# Patient Record
Sex: MALE | Race: WHITE | NOT HISPANIC OR LATINO | Employment: FULL TIME | URBAN - METROPOLITAN AREA
[De-identification: names, ages, dates, MRNs, and addresses within clinical notes are randomized per-mention and may not be internally consistent; named-entity substitution may affect disease eponyms.]

---

## 2020-12-28 ENCOUNTER — CONSULT (OUTPATIENT)
Dept: NEPHROLOGY | Facility: CLINIC | Age: 25
End: 2020-12-28
Payer: COMMERCIAL

## 2020-12-28 VITALS
DIASTOLIC BLOOD PRESSURE: 86 MMHG | HEIGHT: 75 IN | SYSTOLIC BLOOD PRESSURE: 162 MMHG | BODY MASS INDEX: 34.69 KG/M2 | WEIGHT: 279 LBS

## 2020-12-28 DIAGNOSIS — N18.2 STAGE 2 CHRONIC KIDNEY DISEASE: Primary | ICD-10-CM

## 2020-12-28 DIAGNOSIS — I10 ESSENTIAL HYPERTENSION: ICD-10-CM

## 2020-12-28 PROCEDURE — 99204 OFFICE O/P NEW MOD 45 MIN: CPT | Performed by: INTERNAL MEDICINE

## 2020-12-28 RX ORDER — SPIRONOLACTONE 25 MG/1
25 TABLET ORAL DAILY
COMMUNITY
Start: 2020-12-11 | End: 2021-09-17 | Stop reason: SDUPTHER

## 2020-12-28 RX ORDER — HYDRALAZINE HYDROCHLORIDE 100 MG/1
200 TABLET, FILM COATED ORAL 2 TIMES DAILY
COMMUNITY
Start: 2020-12-11 | End: 2021-01-29 | Stop reason: SDUPTHER

## 2020-12-28 RX ORDER — FUROSEMIDE 20 MG/1
20 TABLET ORAL DAILY
COMMUNITY
End: 2021-01-29

## 2020-12-28 RX ORDER — AMLODIPINE BESYLATE 5 MG/1
5 TABLET ORAL DAILY
Qty: 90 TABLET | Refills: 3 | Status: SHIPPED | OUTPATIENT
Start: 2020-12-28 | End: 2021-01-29 | Stop reason: SDUPTHER

## 2020-12-28 RX ORDER — LABETALOL 300 MG/1
300 TABLET, FILM COATED ORAL 2 TIMES DAILY
COMMUNITY
Start: 2020-12-11

## 2020-12-28 RX ORDER — CLONIDINE HYDROCHLORIDE 0.2 MG/1
0.2 TABLET ORAL
COMMUNITY
Start: 2020-12-11 | End: 2021-01-29

## 2021-01-20 ENCOUNTER — TELEPHONE (OUTPATIENT)
Dept: NEPHROLOGY | Facility: CLINIC | Age: 26
End: 2021-01-20

## 2021-01-20 NOTE — TELEPHONE ENCOUNTER
Called and left voicemail for patient to discuss his blood pressure readings  Advised him to call back

## 2021-01-28 ENCOUNTER — TELEPHONE (OUTPATIENT)
Dept: NEPHROLOGY | Facility: CLINIC | Age: 26
End: 2021-01-28

## 2021-01-28 DIAGNOSIS — I10 ESSENTIAL HYPERTENSION: ICD-10-CM

## 2021-01-28 NOTE — TELEPHONE ENCOUNTER
Pt's Father Carmell Leventhal called the office asking if you could please give him a call he has some questions about some testing that Dr Balbir Corcoran wanted the pt to get done  He thinks that it may be similar to some tests that he had done already

## 2021-01-29 RX ORDER — AMLODIPINE BESYLATE 5 MG/1
5 TABLET ORAL 2 TIMES DAILY
Qty: 180 TABLET | Refills: 3 | Status: SHIPPED | OUTPATIENT
Start: 2021-01-29 | End: 2022-01-12

## 2021-01-29 RX ORDER — HYDRALAZINE HYDROCHLORIDE 100 MG/1
100 TABLET, FILM COATED ORAL 3 TIMES DAILY
Qty: 90 TABLET | Refills: 0 | Status: SHIPPED | OUTPATIENT
Start: 2021-01-29 | End: 2022-01-21

## 2021-01-29 RX ORDER — CHLORTHALIDONE 25 MG/1
25 TABLET ORAL DAILY
Qty: 30 TABLET | Refills: 3 | Status: SHIPPED | OUTPATIENT
Start: 2021-01-29 | End: 2021-04-20

## 2021-01-29 NOTE — TELEPHONE ENCOUNTER
Spoke to patient's mother over the phone regarding his blood pressure readings  His blood pressure still remains elevated and above goal   I advised him to stop Lasix, patient has already stopped taking clonidine 3 to 4 days ago due to that makes him really tired  Continue to remain off clonidine  Will start chlorthalidone 25 mg p o  Daily  Change hydralazine from 200 mg p o  B i d  To 100 mg t i d   Continue to monitor blood pressure  Will need to get 24 hour ambulatory blood pressure monitoring  Also with starting chlorthalidone, he will need to be closely monitor for lithium level  I have advised her to contact psychiatrist to see if lithium level needs to be monitored over next several weeks

## 2021-01-29 NOTE — TELEPHONE ENCOUNTER
I spoke to the patients father and mother and they will be faxing over labs done from mid January of this year and schedule his renal US  Patients parents had a question about the 24 HR Monitor which was supposed to be done and [facility] office did not call to schedule  Is this still to be done?, I do not see orders in epic  Blood pressures per mother have been elevated and will call with readings to document into his chart

## 2021-02-03 ENCOUNTER — TELEPHONE (OUTPATIENT)
Dept: NEPHROLOGY | Facility: CLINIC | Age: 26
End: 2021-02-03

## 2021-02-03 NOTE — TELEPHONE ENCOUNTER
I called patient to schedule 24 hr bp monitor, I spoke w/ his father and they are not willing to come to our [facility] office for the BP placement  They only want to go to the [facility] office for the placement and their monitor is out being serviced, and unfortunately we do not have a return date yet  The drive is too far for them to come to [facility] office

## 2021-04-17 DIAGNOSIS — I10 ESSENTIAL HYPERTENSION: ICD-10-CM

## 2021-04-20 ENCOUNTER — TELEPHONE (OUTPATIENT)
Dept: OTHER | Facility: HOSPITAL | Age: 26
End: 2021-04-20

## 2021-04-20 RX ORDER — CHLORTHALIDONE 25 MG/1
TABLET ORAL
Qty: 30 TABLET | Refills: 3 | Status: SHIPPED | OUTPATIENT
Start: 2021-04-20 | End: 2021-09-14

## 2021-04-20 NOTE — TELEPHONE ENCOUNTER
Can you please have him call us with all his BP readings for several days? Would like to know if BP improved after starting chlorthalidone

## 2021-04-21 NOTE — TELEPHONE ENCOUNTER
I called and left detailed message asking patient to call us back to let us know he received our message:  Please call us with BP readings for several days about one week  Would like to know if BP improved after starting chlorthalidone

## 2021-09-14 DIAGNOSIS — I10 ESSENTIAL HYPERTENSION: ICD-10-CM

## 2021-09-14 RX ORDER — CHLORTHALIDONE 25 MG/1
TABLET ORAL
Qty: 30 TABLET | Refills: 3 | Status: SHIPPED | OUTPATIENT
Start: 2021-09-14 | End: 2021-12-17 | Stop reason: SDUPTHER

## 2021-09-16 NOTE — TELEPHONE ENCOUNTER
I spoke to patients mother, Tiffanie Burgos and relayed message  Tiffanie Burgos states patient is on a very difficult work schedule and at physically demanding job he works from [employer] to [employer] and would not be a good candidate for the 24 hour ABPM due to this  Tiffanie Burgos states [place] blood pressures have been around 140/90 and 150/90 and the cardiologist told them that was as good as it will get due to the fact that he is a weight  and has such a physically demanding job  I asked Tiffanie Burgos to have Ofelia Jennings call us with a consistent week of blood pressures for evaluation  Thankyou

## 2021-09-17 DIAGNOSIS — I10 ESSENTIAL HYPERTENSION: Primary | ICD-10-CM

## 2021-09-17 RX ORDER — SPIRONOLACTONE 50 MG/1
50 TABLET, FILM COATED ORAL DAILY
Qty: 30 TABLET | Refills: 5 | Status: SHIPPED | OUTPATIENT
Start: 2021-09-17 | End: 2021-09-23 | Stop reason: SDUPTHER

## 2021-09-21 ENCOUNTER — DOCUMENTATION (OUTPATIENT)
Dept: NEPHROLOGY | Facility: CLINIC | Age: 26
End: 2021-09-21

## 2021-09-21 RX ORDER — ZINC GLUCONATE 50 MG
50 TABLET ORAL DAILY
COMMUNITY

## 2021-09-21 RX ORDER — CHLORAL HYDRATE 500 MG
1000 CAPSULE ORAL DAILY
COMMUNITY

## 2021-09-21 RX ORDER — CHOLECALCIFEROL (VITAMIN D3) 10 MCG
50 CAPSULE ORAL DAILY
COMMUNITY

## 2021-09-21 NOTE — PROGRESS NOTES
Patients mother, Chico Nava noticed patient is in fact taking the aldactone at 25mg daily, I asked to increase to 50mg daily  Please send refill  Thank you

## 2021-09-23 DIAGNOSIS — I10 ESSENTIAL HYPERTENSION: ICD-10-CM

## 2021-09-23 RX ORDER — SPIRONOLACTONE 50 MG/1
50 TABLET, FILM COATED ORAL DAILY
Qty: 30 TABLET | Refills: 5 | Status: SHIPPED | OUTPATIENT
Start: 2021-09-23

## 2021-12-17 DIAGNOSIS — I10 ESSENTIAL HYPERTENSION: ICD-10-CM

## 2021-12-21 RX ORDER — CHLORTHALIDONE 25 MG/1
25 TABLET ORAL DAILY
Qty: 30 TABLET | Refills: 3 | Status: SHIPPED | OUTPATIENT
Start: 2021-12-21

## 2022-01-21 ENCOUNTER — TELEMEDICINE (OUTPATIENT)
Dept: NEPHROLOGY | Facility: CLINIC | Age: 27
End: 2022-01-21
Payer: COMMERCIAL

## 2022-01-21 ENCOUNTER — DOCUMENTATION (OUTPATIENT)
Dept: NEPHROLOGY | Facility: CLINIC | Age: 27
End: 2022-01-21

## 2022-01-21 ENCOUNTER — TELEPHONE (OUTPATIENT)
Dept: OTHER | Facility: OTHER | Age: 27
End: 2022-01-21

## 2022-01-21 VITALS
DIASTOLIC BLOOD PRESSURE: 90 MMHG | BODY MASS INDEX: 32.95 KG/M2 | SYSTOLIC BLOOD PRESSURE: 148 MMHG | HEIGHT: 75 IN | WEIGHT: 265 LBS

## 2022-01-21 DIAGNOSIS — R03.0 ELEVATED BLOOD PRESSURE READING WITHOUT DIAGNOSIS OF HYPERTENSION: ICD-10-CM

## 2022-01-21 DIAGNOSIS — N18.2 STAGE 2 CHRONIC KIDNEY DISEASE: Primary | ICD-10-CM

## 2022-01-21 DIAGNOSIS — I10 ESSENTIAL HYPERTENSION: ICD-10-CM

## 2022-01-21 LAB
CO2 SERPL-SCNC: 26 MMOL/L (ref 21–32)
EXT GLUCOSE BLD: 109
EXTERNAL ALBUMIN: 5
EXTERNAL ALK PHOS: 74
EXTERNAL ALT: 40
EXTERNAL ANION GAP: 12
EXTERNAL AST: 46
EXTERNAL BUN: 29
EXTERNAL CALCIUM: 10.5
EXTERNAL CHLORIDE: 102
EXTERNAL CREATININE: 1.32
EXTERNAL EGFR: >60
EXTERNAL POTASSIUM: 5.2
EXTERNAL SODIUM: 140
EXTERNAL T.BILIRUBIN: 0.3
EXTERNAL TOTAL PROTEIN: 7.8
HCT VFR BLD AUTO: 50.1 % (ref 36.5–49.3)
HGB BLD-MCNC: 16.1 G/DL (ref 12–17)
LITHIUM SERPL-SCNC: 0.6 MMOL/L (ref 0.5–1)
PLATELET # BLD AUTO: 258 THOUSANDS/UL (ref 149–390)
TSH SERPL DL<=0.005 MIU/L-ACNC: 1.51 M[IU]/L
WBC # BLD AUTO: 7.9 THOUSAND/UL

## 2022-01-21 PROCEDURE — 99213 OFFICE O/P EST LOW 20 MIN: CPT | Performed by: PHYSICIAN ASSISTANT

## 2022-01-21 RX ORDER — HYDRALAZINE HYDROCHLORIDE 100 MG/1
100 TABLET, FILM COATED ORAL 2 TIMES DAILY
Qty: 90 TABLET | Refills: 0 | Status: SHIPPED | OUTPATIENT
Start: 2022-01-21

## 2022-01-21 RX ORDER — LABETALOL 100 MG/1
100 TABLET, FILM COATED ORAL 2 TIMES DAILY
Qty: 180 TABLET | Refills: 3 | Status: SHIPPED | OUTPATIENT
Start: 2022-01-21

## 2022-01-21 NOTE — TELEPHONE ENCOUNTER
Pt's mother called in stating the pt received a call this morning and didn't why  I called the office back line and the office was calling to confirm his appt and to state that they have his blood work

## 2022-01-21 NOTE — PATIENT INSTRUCTIONS
Hypertension- Antihypertensive regimen includes amlodipine 5mg twice a day, chlorthalidone 25mg daily, spironolactone 50mg daily, hydralazine 100mg twice a day, labetalol 300mg twice a day  Avoid salt in the diet  Avoid NSAIDs  Stay active  Please bring your home BP cuff to your next office visit for correlation  We would recommend a 24 hour ambulatory blood pressure monitor for accurate readings and better understanding of his home blood pressures for future management  Increase labetalol to 400mg twice a day  I ordered a 100mg tablet to be taken in addition to the 300mg tablet  Please call the office for any lightheadeness/dizziness, heart rate <50, or BP >150/90 or <110/70 consistently  Chronic Kidney Disease stage II- Baseline creatinine 1 3-1 4  Most recent creatinine and electrolytes are at baseline  GFR >60%  Etiology likely due to hypertensive nephrosclerosis and chronic lithium use  Due to large muscle mass, a 24 hour urine creatinine clearance would be more accurate  Chronic Lithium Use- Most recent level is 0 6  Follows with [facility] psychiatry every 3-4 months  His next appointment is in early May  Please follow up with Dr Feliberto Ridley or an advanced practitioner in 6 months  Please call the office with any questions or concerns

## 2022-01-21 NOTE — PROGRESS NOTES
Virtual Regular Visit    Verification of patient location:    Patient is located in the following state in which I hold an active license [place]    Assessment/Plan:    Problem List Items Addressed This Visit        Cardiovascular and Mediastinum    Essential hypertension    Relevant Medications    hydrALAZINE (APRESOLINE) 100 MG tablet    labetalol (NORMODYNE) 100 mg tablet    Other Relevant Orders    Basic metabolic panel    CBC    Urinalysis with microscopic    Protein / creatinine ratio, urine    Magnesium    24 hour blood pressure monitoring       Genitourinary    Stage 2 chronic kidney disease - Primary    Relevant Orders    Basic metabolic panel    CBC    Urinalysis with microscopic    Protein / creatinine ratio, urine    Magnesium      Other Visit Diagnoses     Elevated blood pressure reading without diagnosis of hypertension               Reason for visit is HTN/CKD  Chief Complaint   Patient presents with    Virtual Regular Visit        Encounter provider Vito Teresa, [place]    Provider located at [address] [place] [id number]      Recent Visits  No visits were found meeting these conditions  Showing recent visits within past 7 days and meeting all other requirements  Today's Visits  Date Type Provider Dept   01/21/22 Telemedicine Vito Rachel [address] [place] [address]   Showing today's visits and meeting all other requirements  Future Appointments  No visits were found meeting these conditions  Showing future appointments within next 150 days and meeting all other requirements       The patient was identified by name and date of birth  Hans Amanda was informed that this is a telemedicine visit and that the visit is being conducted through [address] [place] [address] Now and patient was informed that this is a secure, HIPAA-compliant platform  He agrees to proceed     My office door was closed  No one else was in the room    He acknowledged consent and understanding of privacy and security of the video platform  The patient has agreed to participate and understands they can discontinue the visit at any time  Patient is aware this is a billable service  Subjective  Ivan Alonso is a 27 y o  male who is being evaluated for hypertension and chronic kidney disease  He was last seen by Dr Iris Damon in December of 2020  At home, his blood pressure is around 140-50/90 and he checks it an hour after medications  His range is 135/80 to 165  He reduced his hydralazine to twice a day due to feeling lethargic with it  He still goes to the gym  He eats a healthy well balanced diet  He does get lightheaded when doing squats and we discussed changing this to a seated leg press  He denies LE edema or SOB  He denies N/V/D  We discussed doing a 24 hour ambulatory BP monitor and he is agreeable as long as he could do it on a weekend when he is not working  He is a  and would not be able to wear it during work  Assessment/Plan  Hypertension- Antihypertensive regimen includes amlodipine 5mg twice a day, chlorthalidone 25mg daily, spironolactone 50mg daily, hydralazine 100mg twice a day, labetalol 300mg twice a day  Avoid salt in the diet  Avoid NSAIDs  Stay active  Please bring your home BP cuff to your next office visit for correlation  We would recommend a 24 hour ambulatory blood pressure monitor for accurate readings and better understanding of his home blood pressures for future management  Increase labetalol to 400mg twice a day  I ordered a 100mg tablet to be taken in addition to the 300mg tablet  Please call the office for any lightheadeness/dizziness, heart rate <50, or BP >150/90 or <110/70 consistently  Chronic Kidney Disease stage II- Baseline creatinine 1 3-1 4  Most recent creatinine and electrolytes are at baseline  GFR >60%  Etiology likely due to hypertensive nephrosclerosis and chronic lithium use    Due to large muscle mass, a 24 hour urine creatinine clearance would be more accurate  Chronic Lithium Use- Most recent level is 0 6  Follows with [facility] psychiatry every 3-4 months  His next appointment is in early May  Please follow up with Dr Zenaida Romero or an advanced practitioner in 6 months  Please call the office with any questions or concerns  HPI     Past Medical History:   Diagnosis Date    Acute lateral meniscus tear of left knee     work injury occurred in December 2016    ADHD (attention deficit hyperactivity disorder)     since 8 y o     Anxiety     B[facility] disorder ([facility] [address] )     Complete heart block ([facility] [address] )     Secondary to Lyme disease    Depression     Lyme disease        Past Surgical History:   Procedure Laterality Date    ARTHROSCOPY KNEE Left 3/17/2016    Procedure: KNEE ARTHROSCOPY  WITH;  Surgeon: Ravinder Varner MD;  Location:  MAIN OR;  Service:    [address] [place] [address]      temporary pacer for transient CHB    COLONOSCOPY      MT KNEE SCOPE,MED/LAT MENISECTOMY Left 3/17/2016    Procedure: PARTIAL LATERAL MENISCECTOMY ;  Surgeon: Ravinder Varner MD;  Location:  MAIN OR;  Service: Orthopedics       Current Outpatient Medications   Medication Sig Dispense Refill    amLODIPine (NORVASC) 5 mg tablet TAKE 1 TABLET BY MOUTH TWICE DAILY 60 tablet 0    Atomoxetine HCl (STRATTERA PO) Take 80 mg by mouth daily       chlorthalidone 25 mg tablet Take 1 tablet (25 mg total) by mouth daily 30 tablet 3    Cholecalciferol (EQL Vitamin D3) 50 MCG (2000 UT) CAPS Take 50 Units by mouth daily      fexofenadine (ALLEGRA) 60 MG tablet Take 60 mg by mouth daily   Glucosamine-Chondroit-Vit C-Mn (GLUCOSAMINE 1500 COMPLEX PO) Take 1,500 mg by mouth 2 (two) times a day      labetalol (NORMODYNE) 300 mg tablet Take 300 mg by mouth 2 (two) times a day      lamoTRIgine (LaMICtal) 200 MG tablet Take 200 mg by mouth daily Indications: ADHD        lithium 600 MG capsule Take 600 mg by mouth 2 (two) times a day       Omega-3 Fatty Acids (fish oil) 1,000 mg Take 1,000 mg by mouth daily      Sertraline HCl (ZOLOFT PO) Take 50 mg by mouth daily       spironolactone (ALDACTONE) 50 mg tablet Take 1 tablet (50 mg total) by mouth daily 30 tablet 5    zinc gluconate 50 mg tablet Take 50 mg by mouth daily      hydrALAZINE (APRESOLINE) 100 MG tablet Take 1 tablet (100 mg total) by mouth 2 (two) times a day 90 tablet 0    labetalol (NORMODYNE) 100 mg tablet Take 1 tablet (100 mg total) by mouth 2 (two) times a day Take in addition to 300mg tablet for a total of 400mg twice a day  180 tablet 3     No current facility-administered medications for this visit  No Known Allergies    Review of Systems   Constitutional: Negative for appetite change and unexpected weight change  HENT: Negative for hearing loss  Respiratory: Negative for shortness of breath  Cardiovascular: Negative for leg swelling  Gastrointestinal: Negative for nausea and vomiting  Genitourinary: Negative for difficulty urinating  Musculoskeletal: Negative for gait problem  Skin: Negative for rash  Neurological: Negative for dizziness and light-headedness  Psychiatric/Behavioral: Negative for confusion  Video Exam    Vitals:    01/21/22 1036   BP: 148/90   Weight: 120 kg (265 lb)   Height: 6' 3" (1 905 m)       Physical Exam  Constitutional:       General: He is not in acute distress  Appearance: Normal appearance  HENT:      Head: Normocephalic and atraumatic  Nose: Nose normal    Eyes:      General: No scleral icterus  Pulmonary:      Effort: Pulmonary effort is normal  No respiratory distress  Abdominal:      General: There is no distension  Musculoskeletal:         General: No swelling  Normal range of motion  Cervical back: Normal range of motion  Skin:     Coloration: Skin is not jaundiced  Neurological:      Mental Status: He is alert and oriented to person, place, and time   Mental status is at baseline  Psychiatric:         Mood and Affect: Mood normal          Thought Content: Thought content normal           I spent 19 minutes directly with the patient during this visit    VIRTUAL VISIT DISCLAIMER      Trent Shelton verbally agrees to participate in [facility]  Pt is aware that [facility] could be limited without vital signs or the ability to perform a full hands-on physical Arn Plenty understands he or the provider may request at any time to terminate the video visit and request the patient to seek care or treatment in person

## 2022-01-31 ENCOUNTER — TELEPHONE (OUTPATIENT)
Dept: NEPHROLOGY | Facility: CLINIC | Age: 27
End: 2022-01-31

## 2022-01-31 NOTE — TELEPHONE ENCOUNTER
Called patient to schedule 24 BPM  Patient stated he would like to wait until March or April to do this

## 2022-02-22 ENCOUNTER — TELEPHONE (OUTPATIENT)
Dept: NEPHROLOGY | Facility: CLINIC | Age: 27
End: 2022-02-22

## 2022-03-07 NOTE — TELEPHONE ENCOUNTER
Attempted to reach patient to schedule ABPM  Sounded like phone number in system in a fax number now  Will send letter

## 2022-06-08 ENCOUNTER — TELEPHONE (OUTPATIENT)
Dept: NEPHROLOGY | Facility: CLINIC | Age: 27
End: 2022-06-08

## 2022-06-08 NOTE — TELEPHONE ENCOUNTER
Tried to call patient to schedule a follow up with Dr Roxanna Herrera but patient was unavailable  This is the first attempt

## 2022-06-14 NOTE — TELEPHONE ENCOUNTER
Tried to call patient to schedule a follow up, but the phone call would not go through  This is the second attempt

## 2022-06-17 ENCOUNTER — TELEPHONE (OUTPATIENT)
Dept: NEPHROLOGY | Facility: CLINIC | Age: 27
End: 2022-06-17

## 2022-10-10 ENCOUNTER — TELEPHONE (OUTPATIENT)
Dept: PSYCHIATRY | Facility: CLINIC | Age: 27
End: 2022-10-10

## 2022-10-10 NOTE — TELEPHONE ENCOUNTER
Client called back and wanted Ann-Marie Hodges to know that  "I stopped Zoloft about 3 weeks ago because I was worried about the weight gain  It has helped with the weight gain, but I still anxious and somewhat depressed at times " Next scheduled appointment is 11/2/22  Client inquiring if Manny Freddy would like him to try another medication prior to appointment to help with these symptoms  Message sent to Ann-Marie Hodges

## 2022-10-10 NOTE — TELEPHONE ENCOUNTER
Left message for client in response to voice mail received from mother that client stopped Zoloft due to weight gain and it's affecting his mental health  Next scheduled appointment with Zohaib Riverser is 11/2/22  Left voice mail on clarification on date Zoloft was stopped with client

## 2022-10-11 ENCOUNTER — TELEPHONE (OUTPATIENT)
Dept: PSYCHIATRY | Facility: CLINIC | Age: 27
End: 2022-10-11

## 2022-10-11 DIAGNOSIS — F90.2 ATTENTION DEFICIT HYPERACTIVITY DISORDER, COMBINED TYPE: ICD-10-CM

## 2022-10-11 DIAGNOSIS — F17.223: ICD-10-CM

## 2022-10-11 DIAGNOSIS — F31.0 BIPOLAR I DISORDER, MOST RECENT EPISODE HYPOMANIC (HCC): Primary | ICD-10-CM

## 2022-10-11 RX ORDER — ESCITALOPRAM OXALATE 5 MG/1
TABLET ORAL
Qty: 30 TABLET | Refills: 2 | Status: SHIPPED | OUTPATIENT
Start: 2022-10-11 | End: 2022-10-31

## 2022-10-11 NOTE — TELEPHONE ENCOUNTER
Message received from Renata Bajwa that Lexapro was sent to pharmacy for client to try  Called client to inform  Left VM and office number to call with any questions

## 2022-10-31 PROBLEM — F17.223: Status: ACTIVE | Noted: 2022-10-31

## 2022-10-31 PROBLEM — F31.0 BIPOLAR I DISORDER, MOST RECENT EPISODE HYPOMANIC (HCC): Status: ACTIVE | Noted: 2022-10-31

## 2022-10-31 PROBLEM — F90.2 ATTENTION DEFICIT HYPERACTIVITY DISORDER, COMBINED TYPE: Status: ACTIVE | Noted: 2022-10-31

## 2022-11-16 ENCOUNTER — TELEPHONE (OUTPATIENT)
Dept: PSYCHIATRY | Facility: CLINIC | Age: 27
End: 2022-11-16

## 2022-11-16 NOTE — TELEPHONE ENCOUNTER
Pt calling to r/s missed appt with Dr Shauna Breen   Transferred call to [facility], [place]

## 2022-12-07 DIAGNOSIS — F17.223: ICD-10-CM

## 2022-12-07 DIAGNOSIS — F90.2 ATTENTION DEFICIT HYPERACTIVITY DISORDER, COMBINED TYPE: ICD-10-CM

## 2022-12-07 DIAGNOSIS — F31.0 BIPOLAR I DISORDER, MOST RECENT EPISODE HYPOMANIC (HCC): Primary | ICD-10-CM

## 2022-12-07 RX ORDER — LITHIUM CARBONATE 300 MG
600 TABLET ORAL 2 TIMES DAILY
Qty: 360 TABLET | Refills: 0 | Status: SHIPPED | OUTPATIENT
Start: 2022-12-07

## 2022-12-07 RX ORDER — LAMOTRIGINE 200 MG/1
200 TABLET ORAL DAILY
Qty: 90 TABLET | Refills: 0 | Status: SHIPPED | OUTPATIENT
Start: 2022-12-07

## 2022-12-07 RX ORDER — ATOMOXETINE 80 MG/1
80 CAPSULE ORAL DAILY
Qty: 90 CAPSULE | Refills: 0 | Status: SHIPPED | OUTPATIENT
Start: 2022-12-07

## 2022-12-07 NOTE — TELEPHONE ENCOUNTER
Patient requesting refills for the following medications:    Lithium 300mg capsules 2 capsules bid  Lamotrigine 200mg  1 tablet at bedtime  Atomoxetine 80mg capsules 1 capsule daily  Sertraline 50mg tabs daily    [facility] in [place]    Appt scheduled 2/1/2023

## 2023-01-19 ENCOUNTER — TELEPHONE (OUTPATIENT)
Dept: PSYCHIATRY | Facility: CLINIC | Age: 28
End: 2023-01-19

## 2023-01-19 NOTE — TELEPHONE ENCOUNTER
Outreach call place on 1/18/2023 to get updated insurance information    If no insurance good saray estimate and acknowledge to self pay needs to be completed

## 2023-02-01 ENCOUNTER — TELEMEDICINE (OUTPATIENT)
Dept: PSYCHIATRY | Facility: CLINIC | Age: 28
End: 2023-02-01

## 2023-02-01 DIAGNOSIS — F17.223: ICD-10-CM

## 2023-02-01 DIAGNOSIS — F31.0 BIPOLAR I DISORDER, MOST RECENT EPISODE HYPOMANIC (HCC): Primary | ICD-10-CM

## 2023-02-01 DIAGNOSIS — F90.2 ATTENTION DEFICIT HYPERACTIVITY DISORDER, COMBINED TYPE: ICD-10-CM

## 2023-02-01 DIAGNOSIS — I10 ESSENTIAL HYPERTENSION: ICD-10-CM

## 2023-02-01 RX ORDER — ATOMOXETINE 100 MG/1
CAPSULE ORAL
Qty: 90 CAPSULE | Refills: 1 | Status: SHIPPED | OUTPATIENT
Start: 2023-02-01

## 2023-02-01 RX ORDER — LITHIUM CARBONATE 300 MG
TABLET ORAL
Qty: 360 TABLET | Refills: 1 | Status: SHIPPED | OUTPATIENT
Start: 2023-02-01

## 2023-02-01 RX ORDER — LAMOTRIGINE 200 MG/1
TABLET ORAL
Qty: 90 TABLET | Refills: 1 | Status: SHIPPED | OUTPATIENT
Start: 2023-02-01

## 2023-02-01 NOTE — PSYCH
Virtual Regular Visit    Verification of patient location: In  car    Patient is located in the following state in which I hold an active license [place]      Assessment/Plan:       Diagnoses and all orders for this visit:    Bipolar I disorder, most recent episode hypomanic ([address] )  -     sertraline (Zoloft) 50 mg tablet; Take 1 tablet (50 mg total) by mouth daily  -     lithium 300 MG tablet; Take 2 PO BID  -     lamoTRIgine (LaMICtal) 200 MG tablet; Take 1 PO Q HS    Attention deficit hyperactivity disorder, combined type  -     atomoxetine (STRATTERA) 100 MG capsule; Take 1 PO QD    Chewing tobacco nicotine dependence with withdrawal    Essential hypertension          Goals addressed in session:   Good Health  Counseling provided:      Treatment Recommendations- Risks Benefits       Immediate Medical/Psychiatric/Psychotherapy Treatments and Any Precautions:     Risks, Benefits And Possible Side Effects Of Medications:  Risks, benefits, and possible side effects of medications explained to patient and patient verbalizes understanding    Controlled Medication Discussion: No records found for controlled prescriptions according to [facility] [address]      Reason for visit is No chief complaint on file  Medication Management     Encounter provider MARTIN Esposito    Provider located at [address] [address]  [phone]      Recent Visits  No visits were found meeting these conditions  Showing recent visits within past 7 days and meeting all other requirements  Today's Visits  Date Type Provider Dept   02/01/23 Telemedicine Jennifer [facility] today's visits and meeting all other requirements  Future Appointments  No visits were found meeting these conditions    Showing future appointments within next 150 days and meeting all other requirements       The patient was identified by name and date of birth  Burke Lynne was informed that this is a telemedicine visit and that the visit is being conducted throughthe I[employer] platform  He agrees to proceed     My office door was closed  No one else was in the room  He acknowledged consent and understanding of privacy and security of the video platform  The patient has agreed to participate and understands they can discontinue the visit at any time  Patient is aware this is a billable service  Subjective    Burke Lynne is a 32 y o  male    Here today for a med check  This was via [address] [place] Now    normal appetite      HPI Mood good  Denies anxiety  Focus good  No problems with medication  Appetite Sleep good  Health OK  Has lost some Wt  Denies SI/HI    Past Medical History:   Diagnosis Date   • Acute lateral meniscus tear of left knee     work injury occurred in December 2016   • ADHD (attention deficit hyperactivity disorder)     since 8 y o    • Anxiety    • Bipolar disorder ([facility] [address] )    • Complete heart block ([facility] [address] )     Secondary to Lyme disease   • Depression    • Lyme disease        Past Surgical History:   Procedure Laterality Date   • ARTHROSCOPY KNEE Left 3/17/2016    Procedure: KNEE ARTHROSCOPY  WITH;  Surgeon: Ashkan Colin MD;  Location:  MAIN OR;  Service:    • CARDIAC PACEMAKER PLACEMENT      temporary pacer for transient CHB   • COLONOSCOPY     • MO KNEE SCOPE,MED/LAT MENISECTOMY Left 3/17/2016    Procedure: PARTIAL LATERAL MENISCECTOMY ;  Surgeon: Ashkan Colin MD;  Location:  MAIN OR;  Service: Orthopedics       Current Outpatient Medications   Medication Sig Dispense Refill   • atomoxetine (STRATTERA) 100 MG capsule Take 1 PO QD 90 capsule 1   • lamoTRIgine (LaMICtal) 200 MG tablet Take 1 PO Q HS 90 tablet 1   • lithium 300 MG tablet Take 2 PO  tablet 1   • sertraline (Zoloft) 50 mg tablet Take 1 tablet (50 mg total) by mouth daily 90 tablet 1   • amLODIPine (NORVASC) 5 mg tablet TAKE 1 TABLET BY MOUTH TWICE DAILY 60 tablet 0   • chlorthalidone 25 mg tablet Take 1 tablet (25 mg total) by mouth daily 30 tablet 3   • Cholecalciferol (EQL Vitamin D3) 50 MCG (2000 UT) CAPS Take 50 Units by mouth daily     • fexofenadine (ALLEGRA) 60 MG tablet Take 60 mg by mouth daily  • Glucosamine-Chondroit-Vit C-Mn (GLUCOSAMINE 1500 COMPLEX PO) Take 1,500 mg by mouth 2 (two) times a day     • hydrALAZINE (APRESOLINE) 100 MG tablet Take 1 tablet (100 mg total) by mouth 2 (two) times a day 90 tablet 0   • labetalol (NORMODYNE) 100 mg tablet Take 1 tablet (100 mg total) by mouth 2 (two) times a day Take in addition to 300mg tablet for a total of 400mg twice a day  180 tablet 3   • labetalol (NORMODYNE) 300 mg tablet Take 300 mg by mouth 2 (two) times a day     • Omega-3 Fatty Acids (fish oil) 1,000 mg Take 1,000 mg by mouth daily     • spironolactone (ALDACTONE) 50 mg tablet Take 1 tablet (50 mg total) by mouth daily 30 tablet 5   • zinc gluconate 50 mg tablet Take 50 mg by mouth daily       No current facility-administered medications for this visit          No Known Allergies    Social History     Substance and Sexual Activity   Drug Use No       Family History   Problem Relation Age of Onset   • Diabetes Mother    • Hypertension Mother    • Diabetes Father    • Hypertension Father    • Bipolar disorder Sister    • Anxiety disorder Sister            Objective    Mental status:  Appearance calm and cooperative , adequate hygiene and grooming and good eye contact    Mood mood appropriate   Affect affect appropriate    Speech a normal rate and fluent   Thought Processes normal thought processes   Hallucinations no hallucinations present    Thought Content no delusions   Abnormal Thoughts no suicidal thoughts  and no homicidal thoughts    Orientation  oriented to person and place and time   Remote Memory short term memory intact and long term memory intact   Attention Span concentration intact   Intellect Appears to be of Average Intelligence   Insight Insight intact   Judgement judgment was intact   Muscle Strength Muscle strength and tone were normal and Normal gait    Language no difficulty naming common objects   Fund of Knowledge displays adequate knowledge of current events   Pain none   Pain Scale 0       Video Exam    There were no vitals filed for this visit      I spent 15 minutes directly with the patient during this visit    Patient Instructions   Continue Tx  Increase Strattera  [facility]  Report Problems  Return 3 months  Session continued for 5 minutes after he left to document, meds and Tx Plan  Return 3 months       Visit Time    Visit Start Time: 5297  Visit Stop Time: 4672  Total Visit Duration: 23 min

## 2023-02-01 NOTE — PATIENT INSTRUCTIONS
Continue Tx  Increase Strattera  [facility]  Report Problems  Return 3 months  Session continued for 5 minutes after he left to document, meds and Tx Plan  Return 3 months

## 2023-02-01 NOTE — BH TREATMENT PLAN
TREATMENT PLAN (Medication Management Only)        [facility]    Name and Date of Birth:  Joseph Pringle 32 y o  [birth year]  Date of Treatment Plan: February 1, 2023  Diagnosis/Diagnoses:    1  Bipolar I disorder, most recent episode hypomanic ([address] )    2  Attention deficit hyperactivity disorder, combined type    3  Chewing tobacco nicotine dependence with withdrawal    4  Essential hypertension      Strengths/Personal Resources for Self-Care: supportive family, supportive friends, taking medications as prescribed, ability to adapt to life changes, ability to communicate needs, ability to communicate well, ability to listen, ability to reason, ability to understand psychiatric illness  Area/Areas of need (in own words): mood swings  1  Long Term Goal: maintain mood stability  Target Date:6 months - 8/1/2023  Person/Persons responsible for completion of goal: Fabrizio Steinberg  2  Short Term Objective (s) - How will we reach this goal?:   A  Provider new recommended medication/dosage changes and/or continue medication(s): continue current medications as prescribed Zoloft, Lamictal, Lithium, Strattera  B  N/A   C  N/A  Target Date:6 months - 8/1/2023  Person/Persons Responsible for Completion of Goal: Fabrizio Steinberg  Progress Towards Goals: stable  Treatment Modality: medication management every 3 months  Review due 180 days from date of this plan: 6 months - 8/1/2023  Expected length of service: maintenance  My Physician/PA/NP and I have developed this plan together and I agree to work on the goals and objectives  I understand the treatment goals that were developed for my treatment

## 2023-02-03 ENCOUNTER — TELEPHONE (OUTPATIENT)
Dept: PSYCHIATRY | Facility: CLINIC | Age: 28
End: 2023-02-03

## 2023-02-07 NOTE — TELEPHONE ENCOUNTER
Mother left voicemail stating patient was seen earlier in the week but RF of lexapro was not received at pharmacy  Lexapro not mentioned in most recent med check but note from 10/11 mentions med being sent in for patient to try  Forwarding to [facility] Client for review 
Patient's mother is inquiring about Lexapro  She states she is concerned because he hasn't had it  Patient's mother is requesting the Rx to be filled at indicated pharmacy 
TELEMETRY

## 2023-02-08 ENCOUNTER — TELEPHONE (OUTPATIENT)
Dept: PSYCHIATRY | Facility: CLINIC | Age: 28
End: 2023-02-08

## 2023-02-08 DIAGNOSIS — F31.0 BIPOLAR I DISORDER, MOST RECENT EPISODE HYPOMANIC (HCC): Primary | ICD-10-CM

## 2023-02-08 RX ORDER — ESCITALOPRAM OXALATE 5 MG/1
TABLET ORAL
Qty: 90 TABLET | Refills: 1 | Status: SHIPPED | OUTPATIENT
Start: 2023-02-08 | End: 2023-04-20 | Stop reason: SDUPTHER

## 2023-02-08 NOTE — TELEPHONE ENCOUNTER
Call to client (mother present for the call) TOM will be sent also  Client reports he has been taking Lexapro 5 mg (generic) since 10/11/22  He is not taking Zoloft due to weight gain  Client would like to stay on Lexapro, and would like RX sent to [facility] where he has been getting it  Call to [facility] to verify RX was there  RX is there and was sent 10/11/22 by Samira Coombs with 2 refills  Client out of refills  Above sent to Samira Coombs for approval of Lexapro

## 2023-06-15 ENCOUNTER — DOCUMENTATION (OUTPATIENT)
Dept: PSYCHIATRY | Facility: CLINIC | Age: 28
End: 2023-06-15

## 2023-06-15 NOTE — PSYCH
[facility]    Patient Name Heather Malcolm     Date of Birth: 25 y o  1995      MRN: [id number]    Admission Date: several years ago    Date of Transfer: Eboni 15, 2023    Admission Diagnosis:     Bipolar Disorder, type I, hypomanic  ADHD, Combined type    Current Diagnosis:     No diagnosis found  Reason for Admission: Margy Avelar presented for treatment due to depression and ADHD  Primary complaints included MOOD INSTABILITY SYMPTOMS: unremarkable and ADHD SYMPTOMS: unremarkable  Progress in Treatment: Margy Avelar was seen for Medication Management  During the course of treatment he      Episodes of Higher Level of Care: No    Transfer request Initiated by: Psychiatrist: Nurse Practitioner Mich Rodriguez Therapist: None    Reason for Transfer Request: clinician leaving practice    Does this individual need a clinician with specialized training/expertise?: No    Is this client working with any other \[facility] [place] [facility]\"" Providers/Therapists?  Psychiatrist: None Therapist: None    Other pertinent issues: None    Are there any specific individuals who would be a “best fit” or who have already agreed to accept this transfer request?      Psychiatrist: None   Therapist: None  Rationale: Not Applicable    Attempts to maintain the current therapeutic relationship: Not Applicable    Transfer request routed to Clinical Coordinator for input and/or approval      Comments from other involved providers and/or clinical coordinator: None    GUANACO AugusteNP06/15/23

## 2023-06-22 ENCOUNTER — TELEPHONE (OUTPATIENT)
Dept: PSYCHIATRY | Facility: CLINIC | Age: 28
End: 2023-06-22

## 2023-06-22 NOTE — TELEPHONE ENCOUNTER
Contacted client about cancelling 7/12/2023 appt  with MARTIN Rizo due to him retiring  We are in the process of hiring new providers within our practice  Once we have their schedules, we will contact you to reschedule your appt  Please call [phone] to request refills as needed

## 2023-08-14 DIAGNOSIS — F31.0 BIPOLAR I DISORDER, MOST RECENT EPISODE HYPOMANIC (HCC): ICD-10-CM

## 2023-08-14 RX ORDER — ARIPIPRAZOLE 5 MG/1
TABLET ORAL
Qty: 30 TABLET | Refills: 2 | Status: SHIPPED | OUTPATIENT
Start: 2023-08-14

## 2023-08-14 NOTE — TELEPHONE ENCOUNTER
Left message letting pt know that he should have a refill on file on all medications except for the abilify which we sent for approval    Tiffanie Puckett pt, not yet scheduled with new provider

## 2023-10-05 ENCOUNTER — TELEPHONE (OUTPATIENT)
Dept: PSYCHIATRY | Facility: CLINIC | Age: 28
End: 2023-10-05

## 2023-11-02 ENCOUNTER — TELEPHONE (OUTPATIENT)
Dept: PSYCHIATRY | Facility: CLINIC | Age: 28
End: 2023-11-02

## 2023-11-21 ENCOUNTER — OFFICE VISIT (OUTPATIENT)
Dept: PSYCHIATRY | Facility: CLINIC | Age: 28
End: 2023-11-21
Payer: COMMERCIAL

## 2023-11-21 DIAGNOSIS — F90.2 ATTENTION DEFICIT HYPERACTIVITY DISORDER, COMBINED TYPE: ICD-10-CM

## 2023-11-21 DIAGNOSIS — Z79.899 MEDICATION COURSE CHANGED: Primary | ICD-10-CM

## 2023-11-21 DIAGNOSIS — F31.0 BIPOLAR I DISORDER, MOST RECENT EPISODE HYPOMANIC (HCC): ICD-10-CM

## 2023-11-21 PROCEDURE — 90792 PSYCH DIAG EVAL W/MED SRVCS: CPT | Performed by: STUDENT IN AN ORGANIZED HEALTH CARE EDUCATION/TRAINING PROGRAM

## 2023-11-21 RX ORDER — LAMOTRIGINE 200 MG/1
TABLET ORAL
Qty: 90 TABLET | Refills: 1 | Status: SHIPPED | OUTPATIENT
Start: 2023-11-21

## 2023-11-21 RX ORDER — ATOMOXETINE 100 MG/1
CAPSULE ORAL
Qty: 90 CAPSULE | Refills: 1 | Status: SHIPPED | OUTPATIENT
Start: 2023-11-21

## 2023-11-21 RX ORDER — ARIPIPRAZOLE 5 MG/1
TABLET ORAL
Qty: 30 TABLET | Refills: 2 | Status: SHIPPED | OUTPATIENT
Start: 2023-11-21

## 2023-11-21 RX ORDER — LITHIUM CARBONATE 300 MG
TABLET ORAL
Qty: 360 TABLET | Refills: 1 | Status: SHIPPED | OUTPATIENT
Start: 2023-11-21

## 2023-11-21 NOTE — PSYCH
Client Name: Maybell Hamman       Client YOB: 1995  : 1995    Treatment Team:     Psychiatrist: [address] Provider  Philip Salvador, [address]  [facility] [place] [address]        Plan Type: adolescent/adult (15 and over) Initial    My Personal Strengths are (in the client's own words):  "Funny, loving, caring"    The stressors and triggers that may put me at risk are:  Finances, world issues    Coping skills I can use to keep myself calm and safe:  Music, exercise    Coping skills/supports I can use to maintain abstinence from substance use:   Not Applicable    The people that provide me with help and support: (Include name, contact, and how they can help)    Support Persons #1:   *Extended Emergency Contact Information  Primary Emergency Contact: Horner  Address: [address] [place], [address] [address] [address] [place]feiffer Phone:   Relation: Mother   *How they can help me? "Take to hospital"        If it is an emergency and you need immediate help, call     If there is a possibility of danger to yourself or others, call the following crisis hotline resources:     Adult Crisis Numbers  Suicide Prevention Hotline - Dial   [facility]: [address]: [address]: [address]: [phone]  [address] [place] [address] Street: [phone]  [facility]: [address]: [address]: [phone] (daytime). [phone] (after hours, weekends, holidays)     Child/Adolescent Crisis Numbers   [facility]: [address]: [phone]   Neeraj Huger: [phone]   [address] [place] [address] Street: [phone]    Please note: Some [facility] do not have a separate number for Child/Adolescent specific crisis.  If your county is not listed under Child/Adolescent, please call the adult number for your county     National Talk to Text Line   All Ages - [phone]    In the event your feelings become unmanageable, and you cannot reach your support system, you will call 911 immediately or go to the nearest hospital emergency room. If you have any physical or medical emergency or feel as if you are in physical/medical distress, call 911 immediately or go to the nearest hospital emergency room.

## 2023-11-29 NOTE — PSYCH
[facility]    Name and Date of Birth:  Ladan French 25 y.o. [birth year] MRN: [id number]    Date of Visit: November 29, 2023    Reason for visit: Full psychiatric intake assessment for medication management     HPI     Ladan French is a 25 y.o. male with a past psychiatric history significant for  who presents to the [address] [place] [address] outpatient clinic for intake assessment. Judith Valenzuela presents as a new patient for this physician after being transferred from his previous provider who had retired. When speaking to the patient, he denies SI, HI, AVH, delusions with regards to his past history. After thoroughly exploring with patient the signs and symptoms of manic episodes, it appears that patient has symptoms that resemble IED rather than manic episodes. The criteria for manic episodes was thoroughly would reviewed with patient who denied having signs and symptoms for them. At this time, he denies SI, HI, AVH, delusions, seth but does endorse some minor feelings of depression and anxiety secondary to financial difficulties. He states that his concern in the past has been his anger outbursts. He spends his time performing outdoor activities, working, and spending time with family and his pet dog. He denies acute mental complaints or concerns at this time. Current Rating Scores:     Current PHQ-9   PHQ-2/9 Depression Screening           Current JOAO-7 is .     Psychiatric Review Of Systems:    Sleep changes: no  Appetite changes: no  Weight changes: no  Energy/anergy: decreased  Interest/pleasure/anhedonia: decreased  Somatic symptoms: no  Anxiety/panic: worrying  Seth: no  Guilty/hopeless: no  Self injurious behavior/risky behavior: no  Suicidal ideation: no  Homicidal ideation: no  Auditory hallucinations: no  Visual hallucinations: no  Other hallucinations: no  Delusional thinking: no  Eating disorder history: unknown  Obsessive/compulsive symptoms: unknown    Review Of Systems:    Constitutional negative   ENT negative   Cardiovascular negative   Respiratory negative   Gastrointestinal negative   Genitourinary negative   Musculoskeletal negative   Integumentary negative   Neurological negative   Endocrine negative   Other Symptoms none, all other systems are negative       Family Psychiatric History:     Family History   Problem Relation Age of Onset    Diabetes Mother     Hypertension Mother     Diabetes Father     Hypertension Father     Bipolar disorder Sister     Anxiety disorder Sister          Past Psychiatric History:     Inpatient psychiatric admissions: None reported  Prior outpatient psychiatric linkage: None reported  Past/current psychotherapy: None reported  History of suicidal attempts/gestures: None reported  History of violence/aggressive behaviors: States that he has had anger outbursts in the past that have involved him punching things in the house or yelling but denies ever hurting other people. Psychotropic medication trials: Multiple medications in the past the patient cannot recall at this time  Substance abuse inpatient/outpatient rehabilitation: None reported    Substance Abuse History:    No history of ETOH, illict substance, or tobacco abuse. No past legal actions or arrests secondary to substance intoxication. The patient denies prior DWIs/DUIs. No history of outpatient/inpatient rehabilitation programs. Noemi Sauceda does not exhibit objective evidence of substance withdrawal during today's examination nor does Noemi Sauceda appear under the influence of any psychoactive substance. Social History:    Developmental: Denies a history of milestone/developmental delay. Denies a history of in-utero exposure to toxins/illicit substances. There is no documented history of IEP or need for special education.   Education: high school diploma/GED  Marital history:   Living arrangement, social support: wife  Occupational History: Works in the [employer] to [employer]: Denies direct access to weapons/firearms. Daniel Ray has no history of arrests or violence with a deadly weapon. Traumatic History:     Abuse:none is reported  Other Traumatic Events:  None reported    Past Medical History:    Past Medical History:   Diagnosis Date    Acute lateral meniscus tear of left knee     work injury occurred in December 2016    ADHD (attention deficit hyperactivity disorder)     since 8 y.o. Anxiety     Bipolar disorder ([address])     Complete heart block (HCC)     Secondary to Lyme disease    Depression     Lyme disease         Past Surgical History:   Procedure Laterality Date    ARTHROSCOPY KNEE Left 3/17/2016    Procedure: KNEE ARTHROSCOPY  WITH;  Surgeon: Anderson Hernandes MD;  Location:  MAIN OR;  Service:     CARDIAC PACEMAKER PLACEMENT      temporary pacer for transient CHB    COLONOSCOPY      MD KNEE SCOPE,MED/LAT MENISECTOMY Left 3/17/2016    Procedure: PARTIAL LATERAL MENISCECTOMY ;  Surgeon: Anderson Hernandes MD;  Location:  MAIN OR;  Service: Orthopedics     No Known Allergies    History Review: The following portions of the patient's history were reviewed and updated as appropriate: allergies, current medications, past family history, past medical history, past social history, past surgical history, and problem list.    OBJECTIVE:    Vital signs in last 24 hours: There were no vitals filed for this visit.     Mental Status Evaluation:    Appearance age appropriate, casually dressed   Behavior cooperative, calm   Speech normal rate, normal volume, normal pitch   Mood euthymic   Affect constricted   Thought Processes organized, goal directed   Associations intact associations   Thought Content no overt delusions   Perceptual Disturbances: no auditory hallucinations, no visual hallucinations   Abnormal Thoughts  Risk Potential Suicidal ideation - None  Homicidal ideation - None  Potential for aggression - No   Orientation oriented to person, place, time/date, and situation   Memory recent and remote memory grossly intact   Consciousness alert and awake   Attention Span Concentration Span attention span and concentration are age appropriate   Intellect appears to be of average intelligence   Insight intact   Judgement intact   Muscle Strength and  Gait normal muscle strength and normal muscle tone, normal gait and normal balance   Motor Activity no abnormal movements   Language no difficulty naming common objects, no difficulty repeating a phrase   Fund of Knowledge adequate knowledge of current events  adequate fund of knowledge regarding past history  adequate fund of knowledge regarding vocabulary    Pain none   Pain Scale 0       Laboratory Results: I have personally reviewed all pertinent laboratory/tests results    Recent Labs (last 2 months):   No visits with results within 2 Month(s) from this visit. Latest known visit with results is:   Documentation on 01/21/2022   Component Date Value    WBC 01/04/2022 7.90     Hemoglobin 01/04/2022 16.1     Hematocrit 01/04/2022 50.1 (A)     Platelets 76/28/0997 258     SODIUM 01/04/2022 140     POTASSIUM 01/04/2022 5.2     CHLORIDE 01/04/2022 102     CO2 01/04/2022 26     ANION GAP 01/04/2022 12     BUN 01/04/2022 29     CREATININE 01/04/2022 1.32     Glucose 01/04/2022 109     EXTERNAL CALCIUM 01/04/2022 10.5     TOTAL PROTEIN 01/04/2022 7.8     ALBUMIN 01/04/2022 5.0     AST 01/04/2022 46     ALT 01/04/2022 40     ALK PHOS 01/04/2022 74     EXTERNAL TOT.  BILIRUBIN 01/04/2022 0.3     EXTERNAL EGFR 01/04/2022 >60     Lithium Lvl 01/04/2022 0.6     TSH 01/04/2022 1.510        Suicide/Homicide Risk Assessment:    Risk of Harm to Self:  The following ratings are based on assessment at the time of the interview  Historical Risk Factors include: chronic psychiatric problems  Protective Factors: no current suicidal ideation, access to mental health treatment, being , compliant with medications, compliant with mental health treatment, having a desire to be alive    Risk of Harm to Others: The following ratings are based on assessment at the time of the interview  Historical Risk Factors include: history of aggressive behavior. Protective Factors: no current homicidal ideation, being , compliant with medications, compliant with mental health treatment, safe and stable living environment, supportive family, supportive friends    The following interventions are recommended: contracts for safety at present - agrees to go to ED if feeling unsafe, contracts for safety at present - agrees to call Crisis Intervention Service if feeling unsafe. Although patient's acute lethality risk is LOW, long-term/chronic lethality risk is mildly elevated given historical mental health diagoses. However, at the current moment, Gabby Schneider is future-oriented, forward-thinking, and demonstrates ability to act in a self-preserving manner as evidenced by volitionally presenting to the appointment today, seeking treatment. Additionally, Santana's responses suggest a will and desire to live. At this juncture, inpatient hospitalization is not currently warranted. To mitigate future risk, patient should adhere to treatment recommendations, avoid alcohol/illicit substance use, utilize community-based resources and familiar support, and prioritize mental health treatment. DSM-V Diagnoses:     1.)  IED  2.)  JOAO  3.)  Rule out BPAD    Assessment/Plan:     After discussion of risks, benefits, potential side effects and alternatives, will continue patient on current regimen and assessed lithium level and follow-up thereafter to see if any modifications can be made to help patient with impulse control. He voices understanding and agreement at our crisis plan and denies that his anger outbursts have led to him hurting others including his partner.   He denies acute mental complaints or concerns at this time. Today's Plan/Medical Decision Making:    Psychopharmacologically, I spoke at length with Malachi Cleaning about the bio-psycho-social approach to treatment and avenues for intervention. I stressed the importance of making better dietary choices, expanding exercise regimen, and reestablishing a sense of purpose and connectivity in life. I also emphasized the importance of establishing care with the primary care physician along with specialists relevant to their medical diagnoses to which the patient voiced understanding and agreement. Treatment Recommendations/Precautions:        Medication management every 1 months  Aware of 24 hour and weekend coverage for urgent situations accessed by calling [facility] main practice number    Patient voiced understanding and agreement to call 911 or head to the nearest emergency room should they experience any physical decompensation whatsoever including but not limited to the red flag signs and symptoms of fevers, chills, chest pains, nausea, vomiting, dizziness, changes in vision, trouble breathing. Patient was also offered the contact information of their local [facility] crisis hotline and voiced understanding and agreement to call it or [phone] or head to nearest emergency department immediately should they experience any mental health decompensation whatsoever including but not limited to SI, HI, increasing AVH, seth. Medications Risks/Benefits:      Risks, Benefits And Possible Side Effects Of Medications:    Risks, benefits, and possible side effects of medications explained to Malachi Cleaning and he verbalizes understanding and agreement for treatment. Controlled Medication Discussion:     Not applicable    Treatment Plan:    Completed and signed during the session:   We will complete at next appointment due to lack of time today      Visit Time    Visit Start Time: 3:00 PM  Visit Stop Time: 3:45 PM  Total Visit Duration:  45 minutes The total visit duration detailed above includes: patient engagement, medication management, psychotherapy/counseling, discussion regarding treatment goals, documentation, review of past medical records, and coordination of care. Note Share Disclaimer:      This note was not shared with the patient due to reasonable likelihood of causing patient harm    Hazel Alcaraz DO  11/29/23

## 2023-12-06 ENCOUNTER — TELEPHONE (OUTPATIENT)
Dept: PSYCHIATRY | Facility: CLINIC | Age: 28
End: 2023-12-06

## 2023-12-06 NOTE — TELEPHONE ENCOUNTER
Outreach call place to inform client that the 12/19/23 appointment needs to be Virtual due to Dr Cherelle Padgett changing to Virtual this day. If in-person appointment preferred there is availability on 12/18/23. Requested a call back to [phone].

## 2024-01-04 ENCOUNTER — TELEMEDICINE (OUTPATIENT)
Dept: PSYCHIATRY | Facility: CLINIC | Age: 29
End: 2024-01-04
Payer: COMMERCIAL

## 2024-01-04 DIAGNOSIS — F90.2 ATTENTION DEFICIT HYPERACTIVITY DISORDER, COMBINED TYPE: Primary | ICD-10-CM

## 2024-01-04 PROCEDURE — 99212 OFFICE O/P EST SF 10 MIN: CPT | Performed by: STUDENT IN AN ORGANIZED HEALTH CARE EDUCATION/TRAINING PROGRAM

## 2024-01-04 NOTE — PSYCH
MEDICATION MANAGEMENT NOTE        [facility]      Name and Date of Birth:  Santana Norris 28 y.o. 1995 MRN: [id number]    Date of Visit: January 4, 2024    Reason for Visit: Follow-up visit for medication management     Virtual Visit Disclaimer:       TeleMed provider: Hazel Alcaraz D.O.    Location: [place]     Verification of patient location:     Patient is currently located in the [facility]  Patient is currently located in a state in which I am licensed     After connecting through [employer], the patient was identified by name and date of birth.  Santana Norris was informed that this is a telemedicine visit that is being conducted through [employer], and the patient was informed that this is a secure, HIPAA-compliant platform. My office door was closed. No one else was in the room. Santana Norris acknowledged consent and understanding of privacy and security of the video platform. Santana understands that the online visit is based solely on information provided by the patient, and that, in the absence of a face-to-face physical evaluation by the physician, the diagnosis Santana  receives is both limited and provisional in terms of accuracy and completeness. Santana Norris understands that they can discontinue the visit at any time. I informed Santana that I have reviewed their record in EPIC and presented the opportunity for them to ask any questions regarding the visit today. Santana Norris voiced understanding and consented to these terms. Santana is aware this is a billable service. Santana is present in [place]      SUBJECTIVE:    Santana Norris is a 28 y.o. male with past psychiatric history significant for  who was personally seen and evaluated today at the [facility] outpatient clinic for follow-up and medication management. Santana denies SI, HI, AVH, delusions, seth since our last appointment.  They did endorse some feelings of  depression and anxiety which have been manageable and attributable to the stress of organizing holiday gatherings.  Patient states that he has been feeling better now that the holidays are over.  He did mention that he had self-discontinued escitalopram 5 mg/day and that he noticed no difference with his mood.  Otherwise, he endorses compliance with the rest of his regimen, denies acute mental complaints or concerns at this time and voices understanding and agreement to our crisis plan.  He remains engaged with family, friends and personal hobbies        Current Rating Scores:     None completed today.    Review Of Systems:      Constitutional negative   ENT negative   Cardiovascular negative   Respiratory negative   Gastrointestinal negative   Genitourinary negative   Musculoskeletal negative   Integumentary negative   Neurological negative   Endocrine negative   Other Symptoms none, all other systems are negative       Past Psychiatric History: (unchanged information from previous note copied and italicized) - Information that is bolded has been updated.         Substance Abuse History: (unchanged information from previous note copied and italicized) - Information that is bolded has been updated.         Social History: (unchanged information from previous note copied and italicized) - Information that is bolded has been updated.         Traumatic History: (unchanged information from previous note copied and italicized) - Information that is bolded has been updated.           Past Medical History:    Past Medical History:   Diagnosis Date    Acute lateral meniscus tear of left knee     work injury occurred in December 2016    ADHD (attention deficit hyperactivity disorder)     since 4 y.o.    Anxiety     Bipolar disorder (HCC)     Complete heart block (HCC)     Secondary to Lyme disease    Depression     Lyme disease         Past Surgical History:   Procedure Laterality Date    ARTHROSCOPY KNEE Left 3/17/2016     Procedure: KNEE ARTHROSCOPY  WITH;  Surgeon: Lillian Mora MD;  Location:  MAIN OR;  Service:     CARDIAC PACEMAKER PLACEMENT      temporary pacer for transient CHB    COLONOSCOPY      FL KNEE SCOPE,MED/LAT MENISECTOMY Left 3/17/2016    Procedure: PARTIAL LATERAL MENISCECTOMY ;  Surgeon: Lillian Mora MD;  Location:  MAIN OR;  Service: Orthopedics     No Known Allergies    Substance Abuse History:    Social History     Substance and Sexual Activity   Alcohol Use Yes    Comment: on occasion     Social History     Substance and Sexual Activity   Drug Use No       Social History:    Social History     Socioeconomic History    Marital status: Single     Spouse name: Not on file    Number of children: Not on file    Years of education: Not on file    Highest education level: Not on file   Occupational History    Not on file   Tobacco Use    Smoking status: Former    Smokeless tobacco: Never    Tobacco comments:     occ. cigar since 5 yrs.    Substance and Sexual Activity    Alcohol use: Yes     Comment: on occasion    Drug use: No    Sexual activity: Not on file   Other Topics Concern    Not on file   Social History Narrative    Not on file     Social Determinants of Health     Financial Resource Strain: Not on file   Food Insecurity: Not on file   Transportation Needs: Not on file   Physical Activity: Not on file   Stress: Not on file   Social Connections: Not on file   Intimate Partner Violence: Not on file   Housing Stability: Not on file       Family Psychiatric History:     Family History   Problem Relation Age of Onset    Diabetes Mother     Hypertension Mother     Diabetes Father     Hypertension Father     Bipolar disorder Sister     Anxiety disorder Sister        History Review: The following portions of the patient's history were reviewed and updated as appropriate: allergies, current medications, past family history, past medical history, past social history, past surgical history, and problem  list.         OBJECTIVE:     Vital signs in last 24 hours:    There were no vitals filed for this visit.    Mental Status Evaluation:    Appearance age appropriate, casually dressed   Behavior cooperative, calm   Speech normal rate, normal volume, normal pitch   Mood normal   Affect constricted   Thought Processes organized, goal directed   Associations intact associations   Thought Content no overt delusions   Perceptual Disturbances: no auditory hallucinations, no visual hallucinations   Abnormal Thoughts  Risk Potential Suicidal ideation - None  Homicidal ideation - None  Potential for aggression - No   Orientation oriented to person, place, time/date, and situation   Memory recent and remote memory grossly intact   Consciousness alert and awake   Attention Span Concentration Span attention span and concentration are age appropriate   Intellect appears to be of average intelligence   Insight intact   Judgement intact   Muscle Strength and  Gait unable to assess today due to virtual visit   Motor activity unable to assess today due to virtual visit   Language no difficulty naming common objects, no difficulty repeating a phrase   Fund of Knowledge adequate knowledge of current events  adequate fund of knowledge regarding past history  adequate fund of knowledge regarding vocabulary    Pain none   Pain Scale Did not ask patient to formally rate       Laboratory Results: I have personally reviewed all pertinent laboratory/tests results    Recent Labs (last 2 months):   No visits with results within 2 Month(s) from this visit.   Latest known visit with results is:   Documentation on 01/21/2022   Component Date Value    WBC 01/04/2022 7.90     Hemoglobin 01/04/2022 16.1     Hematocrit 01/04/2022 50.1 (A)     Platelets 01/04/2022 258     SODIUM 01/04/2022 140     POTASSIUM 01/04/2022 5.2     CHLORIDE 01/04/2022 102     CO2 01/04/2022 26     ANION GAP 01/04/2022 12     BUN 01/04/2022 29     CREATININE 01/04/2022 1.32      Glucose 01/04/2022 109     EXTERNAL CALCIUM 01/04/2022 10.5     TOTAL PROTEIN 01/04/2022 7.8     ALBUMIN 01/04/2022 5.0     AST 01/04/2022 46     ALT 01/04/2022 40     ALK PHOS 01/04/2022 74     EXTERNAL TOT. BILIRUBIN 01/04/2022 0.3     EXTERNAL EGFR 01/04/2022 >60     Lithium Lvl 01/04/2022 0.6     TSH 01/04/2022 1.510        Suicide/Homicide Risk Assessment:    Risk of Harm to Self:  The following ratings are based on assessment at the time of the interview  Historical Risk Factors include: chronic psychiatric problems  Protective Factors: no current suicidal ideation, compliant with mental health treatment, good self-esteem, having a desire to be alive, stable living environment, stable job, strong relationships    Risk of Harm to Others:  The following ratings are based on assessment at the time of the interview  Historical Risk Factors include: history of aggressive behavior.  Protective Factors: no current homicidal ideation, compliant with mental health treatment, good self-esteem, safe and stable living environment    The following interventions are recommended: contracts for safety at present - agrees to go to ED if feeling unsafe, contracts for safety at present - agrees to call Crisis Intervention Service if feeling unsafe      Lethality Statement:    Based on today's assessment and clinical criteria, Santana Norris contracts for safety and is not an imminent risk of harm to self or others. Outpatient level of care is deemed appropriate at this current time. Santana understands that if they can no longer contract for safety, they need to call the office or report to their nearest Emergency Room for immediate evaluation. They voiced understanding and agreement to call 911 or head to the nearest ED should they have any physical or mental decompensation whatsoever.       Assessment/Plan:        1.)  IED  2.)  JOAO  3.)  Rule out BPAD    After discussion of risks, benefits, and side effects, alternatives,  we will continue current regimen of Abilify 5 mg/day, Strattera 100 mg/day, Lamictal 200 mg/day, lithium 300 mg twice daily.  Patient voices understanding and agreement to fax a copy of the results of his lithium level along with his other labs as soon as possible.  He voices understanding and agreement to our crisis plan and denies acute mental health complaints or concerns at this time.        Medication management every 3 months  Aware of 24 hour and weekend coverage for urgent situations accessed by calling [facility] main practice number    Medications Risks/Benefits      Risks, Benefits And Possible Side Effects Of Medications:    Risks, benefits, and possible side effects of medications explained to Santana and he verbalizes understanding and agreement for treatment.    Controlled Medication Discussion:     Not applicable    Psychotherapy Provided:     Individual psychotherapy provided: Crisis/safety plan discussed with Santana.     Treatment Plan:    Completed and signed during the session:  We will perform in next appointment due to lack of time today      Visit Time    Visit Start Time: 4:30 PM  Visit Stop Time: 4:40 PM  Total Visit Duration:  10 minutes     The total visit duration detailed above includes: patient engagement, medication management, psychotherapy/counseling, discussion regarding treatment goals, documentation, review of past medical records, and coordination of care.      Note Share Disclaimer:     This note was not shared with the patient due to reasonable likelihood of causing patient harm      Hazel Alcaraz DO  Psychiatry  01/04/24

## 2024-02-05 ENCOUNTER — TELEPHONE (OUTPATIENT)
Dept: PSYCHIATRY | Facility: CLINIC | Age: 29
End: 2024-02-05

## 2024-02-05 NOTE — TELEPHONE ENCOUNTER
Patient called and requested a letter excusing him from jury duty due to mental health diagnosis. He feels that serving in jury duty would be detrimental to his current mental health status.   Writer advised someone would reach out to discuss details of the request

## 2024-02-07 NOTE — TELEPHONE ENCOUNTER
Patient called stating he has no heard back about the letter. Patient was transferred to medical records to request a letter.

## 2024-03-28 ENCOUNTER — TELEMEDICINE (OUTPATIENT)
Dept: PSYCHIATRY | Facility: CLINIC | Age: 29
End: 2024-03-28
Payer: COMMERCIAL

## 2024-03-28 DIAGNOSIS — F63.81 INTERMITTENT EXPLOSIVE DISORDER: ICD-10-CM

## 2024-03-28 DIAGNOSIS — F32.A DEPRESSION, UNSPECIFIED DEPRESSION TYPE: ICD-10-CM

## 2024-03-28 DIAGNOSIS — F31.0 BIPOLAR I DISORDER, MOST RECENT EPISODE HYPOMANIC (HCC): Primary | ICD-10-CM

## 2024-03-28 DIAGNOSIS — F90.9 ATTENTION DEFICIT HYPERACTIVITY DISORDER (ADHD), UNSPECIFIED ADHD TYPE: ICD-10-CM

## 2024-03-28 PROCEDURE — 99214 OFFICE O/P EST MOD 30 MIN: CPT | Performed by: STUDENT IN AN ORGANIZED HEALTH CARE EDUCATION/TRAINING PROGRAM

## 2024-04-01 RX ORDER — BUPROPION HYDROCHLORIDE 150 MG/1
150 TABLET ORAL DAILY
Qty: 30 TABLET | Refills: 0 | Status: SHIPPED | OUTPATIENT
Start: 2024-04-01 | End: 2024-05-01

## 2024-04-01 RX ORDER — ARIPIPRAZOLE 5 MG/1
TABLET ORAL
Qty: 30 TABLET | Refills: 2 | Status: SHIPPED | OUTPATIENT
Start: 2024-04-01

## 2024-04-01 NOTE — PSYCH
MEDICATION MANAGEMENT NOTE        [facility]      Name and Date of Birth:  Santana Norris 28 y.o. 1995 MRN: [id number]    Date of Visit: April 1, 2024    Reason for Visit: Follow-up visit for medication management     Virtual Visit Disclaimer:       TeleMed provider: Hazel Alcaraz D.O.    Location: [place]     Verification of patient location:     Patient is currently located in the state [facility]  Patient is currently located in a state in which I am licensed     After connecting through [employer], the patient was identified by name and date of birth.  Santana Norris was informed that this is a telemedicine visit that is being conducted through [employer], and the patient was informed that this is a secure, HIPAA-compliant platform. My office door was closed. No one else was in the room. Santana Norris acknowledged consent and understanding of privacy and security of the video platform. Santana understands that the online visit is based solely on information provided by the patient, and that, in the absence of a face-to-face physical evaluation by the physician, the diagnosis Santana  receives is both limited and provisional in terms of accuracy and completeness. Santana Norris understands that they can discontinue the visit at any time. I informed Santana that I have reviewed their record in EPIC and presented the opportunity for them to ask any questions regarding the visit today. Santana Norris voiced understanding and consented to these terms. Santana is aware this is a billable service. Santana is present in [place]      SUBJECTIVE:    Santana Norris is a 28 y.o. male with past psychiatric history significant for  who was personally seen and evaluated today at the [facility] outpatient clinic for follow-up and medication management. Santana denies SI, HI, AVH, delusions, seth since our last appointment.  Patient did endorse some feelings of  depression and anxiety and lack of motivation.  He declined to reinitiate escitalopram and after discussion of risks and benefits, that side effects, alternatives, we will trial bupropion 150 mg every morning with the intent to titrate upwards as tolerated and effective.  Otherwise, he remains engaged with family, friends, work and personal hobbies.  He is also looking forward to the summer.        Current Rating Scores:     None completed today.    Review Of Systems:      Constitutional negative   ENT negative   Cardiovascular negative   Respiratory negative   Gastrointestinal negative   Genitourinary negative   Musculoskeletal negative   Integumentary negative   Neurological negative   Endocrine negative   Other Symptoms none, all other systems are negative       Past Psychiatric History: (unchanged information from previous note copied and italicized) - Information that is bolded has been updated.   See intake      Substance Abuse History: (unchanged information from previous note copied and italicized) - Information that is bolded has been updated.     See intake    Social History: (unchanged information from previous note copied and italicized) - Information that is bolded has been updated.     See intake    Traumatic History: (unchanged information from previous note copied and italicized) - Information that is bolded has been updated.     See intake      Past Medical History:    Past Medical History:   Diagnosis Date    Acute lateral meniscus tear of left knee     work injury occurred in December 2016    ADHD (attention deficit hyperactivity disorder)     since 4 y.o.    Anxiety     Bipolar disorder (HCC)     Complete heart block (HCC)     Secondary to Lyme disease    Depression     Lyme disease         Past Surgical History:   Procedure Laterality Date    ARTHROSCOPY KNEE Left 3/17/2016    Procedure: KNEE ARTHROSCOPY  WITH;  Surgeon: Lillian Mora MD;  Location: [facility] OR;  Service:     CARDIAC PACEMAKER  PLACEMENT      temporary pacer for transient CHB    COLONOSCOPY      RI ARTHRS KNE SURG W/MENISCECTOMY MED/LAT W/SHVG Left 3/17/2016    Procedure: PARTIAL LATERAL MENISCECTOMY ;  Surgeon: Lillian Mora MD;  Location:  MAIN OR;  Service: Orthopedics     No Known Allergies    Substance Abuse History:    Social History     Substance and Sexual Activity   Alcohol Use Yes    Comment: on occasion     Social History     Substance and Sexual Activity   Drug Use No       Social History:    Social History     Socioeconomic History    Marital status: Single     Spouse name: Not on file    Number of children: Not on file    Years of education: Not on file    Highest education level: Not on file   Occupational History    Not on file   Tobacco Use    Smoking status: Former    Smokeless tobacco: Never    Tobacco comments:     occ. cigar since 5 yrs.    Substance and Sexual Activity    Alcohol use: Yes     Comment: on occasion    Drug use: No    Sexual activity: Not on file   Other Topics Concern    Not on file   Social History Narrative    Not on file     Social Determinants of Health     Financial Resource Strain: Not on file   Food Insecurity: Not on file   Transportation Needs: Not on file   Physical Activity: Not on file   Stress: Not on file   Social Connections: Not on file   Intimate Partner Violence: Not on file   Housing Stability: Not on file       Family Psychiatric History:     Family History   Problem Relation Age of Onset    Diabetes Mother     Hypertension Mother     Diabetes Father     Hypertension Father     Bipolar disorder Sister     Anxiety disorder Sister        History Review: The following portions of the patient's history were reviewed and updated as appropriate: allergies, current medications, past family history, past medical history, past social history, past surgical history, and problem list.         OBJECTIVE:     Vital signs in last 24 hours:    There were no vitals filed for this visit.    Mental  Status Evaluation:    Appearance age appropriate, casually dressed   Behavior cooperative, calm   Speech normal rate, normal volume, normal pitch   Mood normal   Affect constricted   Thought Processes organized, goal directed   Associations intact associations   Thought Content no overt delusions   Perceptual Disturbances: no auditory hallucinations, no visual hallucinations   Abnormal Thoughts  Risk Potential Suicidal ideation - None  Homicidal ideation - None  Potential for aggression - No   Orientation oriented to person, place, time/date, and situation   Memory recent and remote memory grossly intact   Consciousness alert and awake   Attention Span Concentration Span attention span and concentration are age appropriate   Intellect appears to be of average intelligence   Insight intact   Judgement intact   Muscle Strength and  Gait unable to assess today due to virtual visit   Motor activity unable to assess today due to virtual visit   Language no difficulty naming common objects, no difficulty repeating a phrase   Fund of Knowledge adequate knowledge of current events  adequate fund of knowledge regarding past history  adequate fund of knowledge regarding vocabulary    Pain none   Pain Scale Did not ask patient to formally rate       Laboratory Results: I have personally reviewed all pertinent laboratory/tests results    Recent Labs (last 2 months):   No visits with results within 2 Month(s) from this visit.   Latest known visit with results is:   Documentation on 01/21/2022   Component Date Value    WBC 01/04/2022 7.90     Hemoglobin 01/04/2022 16.1     Hematocrit 01/04/2022 50.1 (A)     Platelets 01/04/2022 258     SODIUM 01/04/2022 140     POTASSIUM 01/04/2022 5.2     CHLORIDE 01/04/2022 102     CO2 01/04/2022 26     ANION GAP 01/04/2022 12     BUN 01/04/2022 29     CREATININE 01/04/2022 1.32     Glucose 01/04/2022 109     EXTERNAL CALCIUM 01/04/2022 10.5     TOTAL PROTEIN 01/04/2022 7.8     ALBUMIN  01/04/2022 5.0     AST 01/04/2022 46     ALT 01/04/2022 40     ALK PHOS 01/04/2022 74     EXTERNAL TOT. BILIRUBIN 01/04/2022 0.3     EXTERNAL EGFR 01/04/2022 >60     Lithium Lvl 01/04/2022 0.6     TSH 01/04/2022 1.510        Suicide/Homicide Risk Assessment:    Risk of Harm to Self:  The following ratings are based on assessment at the time of the interview  Historical Risk Factors include: chronic psychiatric problems  Protective Factors: no current suicidal ideation, compliant with mental health treatment, good self-esteem, having a desire to be alive, stable living environment, stable job, strong relationships    Risk of Harm to Others:  The following ratings are based on assessment at the time of the interview  Historical Risk Factors include: history of aggressive behavior.  Protective Factors: no current homicidal ideation, compliant with mental health treatment, good self-esteem, safe and stable living environment    The following interventions are recommended: contracts for safety at present - agrees to go to ED if feeling unsafe, contracts for safety at present - agrees to call Crisis Intervention Service if feeling unsafe      Lethality Statement:    Based on today's assessment and clinical criteria, Santana Norris contracts for safety and is not an imminent risk of harm to self or others. Outpatient level of care is deemed appropriate at this current time. Santana understands that if they can no longer contract for safety, they need to call the office or report to their nearest Emergency Room for immediate evaluation. They voiced understanding and agreement to call 911 or head to the nearest ED should they have any physical or mental decompensation whatsoever.       Assessment/Plan:        1.)  IED  2.)  JOAO  3.)  Rule out BPAD    After discussion of risks, benefits, and side effects, alternatives, we will continue current regimen of Abilify 5 mg/day, Strattera 100 mg/day, Lamictal 200 mg/day, lithium 300  mg twice daily but we will also add bupropion 150 mg extended release every morning to help with patient's feelings of dysphoria.        Medication management every 3 months  Aware of 24 hour and weekend coverage for urgent situations accessed by calling [facility] main practice number    Medications Risks/Benefits      Risks, Benefits And Possible Side Effects Of Medications:    Risks, benefits, and possible side effects of medications explained to Santana and he verbalizes understanding and agreement for treatment.    Controlled Medication Discussion:     Not applicable    Psychotherapy Provided:     Individual psychotherapy provided: Crisis/safety plan discussed with Santana.     Treatment Plan:    Completed and signed during the session:  We will perform in next appointment due to lack of time today      Visit Time    Visit Start Time: 4:30 PM  Visit Stop Time: 4:40 PM  Total Visit Duration:  10 minutes     The total visit duration detailed above includes: patient engagement, medication management, psychotherapy/counseling, discussion regarding treatment goals, documentation, review of past medical records, and coordination of care.      Note Share Disclaimer:     This note was not shared with the patient due to reasonable likelihood of causing patient harm      Hzael Alcaraz DO  Psychiatry  04/01/24

## 2024-05-03 DIAGNOSIS — F32.A DEPRESSION, UNSPECIFIED DEPRESSION TYPE: ICD-10-CM

## 2024-05-06 RX ORDER — BUPROPION HYDROCHLORIDE 150 MG/1
150 TABLET ORAL DAILY
Qty: 30 TABLET | Refills: 0 | Status: SHIPPED | OUTPATIENT
Start: 2024-05-06 | End: 2024-06-05

## 2024-06-08 DIAGNOSIS — F32.A DEPRESSION, UNSPECIFIED DEPRESSION TYPE: ICD-10-CM

## 2024-06-10 RX ORDER — BUPROPION HYDROCHLORIDE 150 MG/1
150 TABLET ORAL DAILY
Qty: 30 TABLET | Refills: 0 | Status: SHIPPED | OUTPATIENT
Start: 2024-06-10

## 2024-07-11 ENCOUNTER — TELEMEDICINE (OUTPATIENT)
Dept: PSYCHIATRY | Facility: CLINIC | Age: 29
End: 2024-07-11
Payer: COMMERCIAL

## 2024-07-11 DIAGNOSIS — F31.0 BIPOLAR I DISORDER, MOST RECENT EPISODE HYPOMANIC (HCC): ICD-10-CM

## 2024-07-11 DIAGNOSIS — F32.A DEPRESSION, UNSPECIFIED DEPRESSION TYPE: ICD-10-CM

## 2024-07-11 PROCEDURE — 99214 OFFICE O/P EST MOD 30 MIN: CPT | Performed by: STUDENT IN AN ORGANIZED HEALTH CARE EDUCATION/TRAINING PROGRAM

## 2024-07-13 RX ORDER — BUPROPION HYDROCHLORIDE 150 MG/1
150 TABLET ORAL DAILY
Qty: 30 TABLET | Refills: 2 | Status: SHIPPED | OUTPATIENT
Start: 2024-07-13 | End: 2024-10-11

## 2024-07-13 RX ORDER — ARIPIPRAZOLE 5 MG/1
TABLET ORAL
Qty: 30 TABLET | Refills: 2 | Status: SHIPPED | OUTPATIENT
Start: 2024-07-13

## 2024-07-13 RX ORDER — LITHIUM CARBONATE 300 MG
600 TABLET ORAL DAILY
Qty: 60 TABLET | Refills: 2 | Status: SHIPPED | OUTPATIENT
Start: 2024-07-13 | End: 2024-10-11

## 2024-07-13 RX ORDER — LAMOTRIGINE 200 MG/1
TABLET ORAL
Qty: 90 TABLET | Refills: 1 | Status: SHIPPED | OUTPATIENT
Start: 2024-07-13

## 2024-07-13 NOTE — PSYCH
MEDICATION MANAGEMENT NOTE        [facility] [place] [facility]      Name and Date of Birth:  Santana Norris 29 y.o. 1995 MRN: [id number]    Date of Visit: July 13, 2024    Reason for Visit: Follow-up visit for medication management     Virtual Visit Disclaimer:       TeleMed provider: Hazel Alcaraz D.O.    Location: [place]     Verification of patient location:     Patient is currently located in the state [facility]  Patient is currently located in a state in which I am licensed     After connecting through Kinopto, the patient was identified by name and date of birth.  Santana Norris was informed that this is a telemedicine visit that is being conducted through VouchedFor, and the patient was informed that this is a secure, HIPAA-compliant platform. My office door was closed. No one else was in the room. Santana Norris acknowledged consent and understanding of privacy and security of the video platform. Santana understands that the online visit is based solely on information provided by the patient, and that, in the absence of a face-to-face physical evaluation by the physician, the diagnosis Santana  receives is both limited and provisional in terms of accuracy and completeness. Santana Norris understands that they can discontinue the visit at any time. I informed Santana that I have reviewed their record in EPIC and presented the opportunity for them to ask any questions regarding the visit today. Santana Norris voiced understanding and consented to these terms. Santana is aware this is a billable service. Santana is present in [place]      SUBJECTIVE:    Santana Norris is a 29 y.o. male with past psychiatric history significant for  who was personally seen and evaluated today at the [facility] outpatient clinic for follow-up and medication management. Santana denies SI, HI, AVH, delusions, seth since our last appointment.  Patient did endorse some stress at  work but denied any acute mental complaints or concerns at this time.  After discussion of risks, benefits, and potential side effects, alternatives, he declines to change any of his medications, believing that it is effective for his mood.        Current Rating Scores:     None completed today.    Review Of Systems:      Constitutional negative   ENT negative   Cardiovascular negative   Respiratory negative   Gastrointestinal negative   Genitourinary negative   Musculoskeletal negative   Integumentary negative   Neurological negative   Endocrine negative   Other Symptoms none, all other systems are negative       Past Psychiatric History: (unchanged information from previous note copied and italicized) - Information that is bolded has been updated.   See intake      Substance Abuse History: (unchanged information from previous note copied and italicized) - Information that is bolded has been updated.     See intake    Social History: (unchanged information from previous note copied and italicized) - Information that is bolded has been updated.     See intake    Traumatic History: (unchanged information from previous note copied and italicized) - Information that is bolded has been updated.     See intake      Past Medical History:    Past Medical History:   Diagnosis Date    Acute lateral meniscus tear of left knee     work injury occurred in December 2016    ADHD (attention deficit hyperactivity disorder)     since 4 y.o.    Anxiety     Bipolar disorder (HCC)     Complete heart block (HCC)     Secondary to Lyme disease    Depression     Lyme disease         Past Surgical History:   Procedure Laterality Date    ARTHROSCOPY KNEE Left 3/17/2016    Procedure: KNEE ARTHROSCOPY  WITH;  Surgeon: Lillian Mora MD;  Location:  MAIN OR;  Service:     CARDIAC PACEMAKER PLACEMENT      temporary pacer for transient CHB    COLONOSCOPY      GA ARTHRS KNE SURG W/MENISCECTOMY MED/LAT W/SHVG Left 3/17/2016    Procedure: PARTIAL  LATERAL MENISCECTOMY ;  Surgeon: Lillian Mora MD;  Location: [facility] OR;  Service: Orthopedics     No Known Allergies    Substance Abuse History:    Social History     Substance and Sexual Activity   Alcohol Use Yes    Comment: on occasion     Social History     Substance and Sexual Activity   Drug Use No       Social History:    Social History     Socioeconomic History    Marital status: Single     Spouse name: Not on file    Number of children: Not on file    Years of education: Not on file    Highest education level: Not on file   Occupational History    Not on file   Tobacco Use    Smoking status: Former    Smokeless tobacco: Never    Tobacco comments:     occ. cigar since 5 yrs.    Substance and Sexual Activity    Alcohol use: Yes     Comment: on occasion    Drug use: No    Sexual activity: Not on file   Other Topics Concern    Not on file   Social History Narrative    Not on file     Social Determinants of Health     Financial Resource Strain: Not on file   Food Insecurity: Not on file   Transportation Needs: Not on file   Physical Activity: Not on file   Stress: Not on file   Social Connections: Not on file   Intimate Partner Violence: Not on file   Housing Stability: Not on file       Family Psychiatric History:     Family History   Problem Relation Age of Onset    Diabetes Mother     Hypertension Mother     Diabetes Father     Hypertension Father     Bipolar disorder Sister     Anxiety disorder Sister        History Review: The following portions of the patient's history were reviewed and updated as appropriate: allergies, current medications, past family history, past medical history, past social history, past surgical history, and problem list.         OBJECTIVE:     Vital signs in last 24 hours:    There were no vitals filed for this visit.    Mental Status Evaluation:    Appearance age appropriate, casually dressed   Behavior cooperative, calm   Speech normal rate, normal volume, normal pitch   Mood  normal   Affect constricted   Thought Processes organized, goal directed   Associations intact associations   Thought Content no overt delusions   Perceptual Disturbances: no auditory hallucinations, no visual hallucinations   Abnormal Thoughts  Risk Potential Suicidal ideation - None  Homicidal ideation - None  Potential for aggression - No   Orientation oriented to person, place, time/date, and situation   Memory recent and remote memory grossly intact   Consciousness alert and awake   Attention Span Concentration Span attention span and concentration are age appropriate   Intellect appears to be of average intelligence   Insight intact   Judgement intact   Muscle Strength and  Gait unable to assess today due to virtual visit   Motor activity unable to assess today due to virtual visit   Language no difficulty naming common objects, no difficulty repeating a phrase   Fund of Knowledge adequate knowledge of current events  adequate fund of knowledge regarding past history  adequate fund of knowledge regarding vocabulary    Pain none   Pain Scale Did not ask patient to formally rate       Laboratory Results: I have personally reviewed all pertinent laboratory/tests results    Recent Labs (last 2 months):   No visits with results within 2 Month(s) from this visit.   Latest known visit with results is:   Documentation on 01/21/2022   Component Date Value    WBC 01/04/2022 7.90     Hemoglobin 01/04/2022 16.1     Hematocrit 01/04/2022 50.1 (A)     Platelets 01/04/2022 258     SODIUM 01/04/2022 140     POTASSIUM 01/04/2022 5.2     CHLORIDE 01/04/2022 102     CO2 01/04/2022 26     ANION GAP 01/04/2022 12     BUN 01/04/2022 29     CREATININE 01/04/2022 1.32     Glucose 01/04/2022 109     EXTERNAL CALCIUM 01/04/2022 10.5     TOTAL PROTEIN 01/04/2022 7.8     ALBUMIN 01/04/2022 5.0     AST 01/04/2022 46     ALT 01/04/2022 40     ALK PHOS 01/04/2022 74     EXTERNAL TOT. BILIRUBIN 01/04/2022 0.3     EXTERNAL EGFR 01/04/2022 >60      Lithium Lvl 01/04/2022 0.6     TSH 01/04/2022 1.510        Suicide/Homicide Risk Assessment:    Risk of Harm to Self:  The following ratings are based on assessment at the time of the interview  Historical Risk Factors include: chronic psychiatric problems  Protective Factors: no current suicidal ideation, compliant with mental health treatment, good self-esteem, having a desire to be alive, stable living environment, stable job, strong relationships    Risk of Harm to Others:  The following ratings are based on assessment at the time of the interview  Historical Risk Factors include: history of aggressive behavior.  Protective Factors: no current homicidal ideation, compliant with mental health treatment, good self-esteem, safe and stable living environment    The following interventions are recommended: contracts for safety at present - agrees to go to ED if feeling unsafe, contracts for safety at present - agrees to call Crisis Intervention Service if feeling unsafe      Lethality Statement:    Based on today's assessment and clinical criteria, Santana Norris contracts for safety and is not an imminent risk of harm to self or others. Outpatient level of care is deemed appropriate at this current time. Santana understands that if they can no longer contract for safety, they need to call the office or report to their nearest Emergency Room for immediate evaluation. They voiced understanding and agreement to call 911 or head to the nearest ED should they have any physical or mental decompensation whatsoever.       Assessment/Plan:        1.)  IED  2.)  JOAO  3.)  Rule out BPAD    After discussion of risks, benefits, and side effects, alternatives, we will continue current regimen of Abilify 5 mg/day, Strattera 100 mg/day, Lamictal 200 mg/day, lithium 300 mg twice daily, bupropion 150 mg daily.  Time was spent informing patient that should his blood pressure medications or salt intake change, this may affect his  lithium level and he should make any of his providers aware of the fact that he takes lithium and we will need to inform this physician so that an updated lithium level may be performed.          Medications Risks/Benefits      Risks, Benefits And Possible Side Effects Of Medications:    Risks, benefits, and possible side effects of medications explained to Santana and he verbalizes understanding and agreement for treatment.    Controlled Medication Discussion:     Not applicable    Psychotherapy Provided:     Individual psychotherapy provided: Crisis/safety plan discussed with Santana.     Treatment Plan:    Completed and signed during the session:  We will perform in next appointment due to lack of time today      Visit Time    Visit Start Time: 4:30 PM  Visit Stop Time: 4:40 PM  Total Visit Duration:  10 minutes     The total visit duration detailed above includes: patient engagement, medication management, psychotherapy/counseling, discussion regarding treatment goals, documentation, review of past medical records, and coordination of care.      Note Share Disclaimer:     This note was not shared with the patient due to reasonable likelihood of causing patient harm      Hazel Alcaraz DO  Psychiatry  07/13/24

## 2024-07-17 ENCOUNTER — TELEPHONE (OUTPATIENT)
Dept: PSYCHIATRY | Facility: CLINIC | Age: 29
End: 2024-07-17

## 2024-08-09 DIAGNOSIS — F90.2 ATTENTION DEFICIT HYPERACTIVITY DISORDER, COMBINED TYPE: ICD-10-CM

## 2024-08-09 RX ORDER — ATOMOXETINE 100 MG/1
CAPSULE ORAL
Qty: 90 CAPSULE | Refills: 1 | Status: SHIPPED | OUTPATIENT
Start: 2024-08-09

## 2024-08-27 NOTE — TELEPHONE ENCOUNTER
Patient called requesting refill for all medications that are prescribed by Dr. Hazel Alcaraz, Patient made aware medication was refilled on 7/13/24 for  with  refills to [facility] [place]  pharmacy. Patient instructed to contact the pharmacy to obtain refills of medication. Patient verbalized understanding.

## 2024-09-05 ENCOUNTER — TELEMEDICINE (OUTPATIENT)
Dept: PSYCHIATRY | Facility: CLINIC | Age: 29
End: 2024-09-05
Payer: COMMERCIAL

## 2024-09-05 DIAGNOSIS — F32.A DEPRESSION, UNSPECIFIED DEPRESSION TYPE: ICD-10-CM

## 2024-09-05 DIAGNOSIS — F90.2 ATTENTION DEFICIT HYPERACTIVITY DISORDER, COMBINED TYPE: ICD-10-CM

## 2024-09-05 DIAGNOSIS — F31.0 BIPOLAR I DISORDER, MOST RECENT EPISODE HYPOMANIC (HCC): ICD-10-CM

## 2024-09-05 DIAGNOSIS — Z79.899 MEDICATION COURSE CHANGED: Primary | ICD-10-CM

## 2024-09-05 PROCEDURE — 99214 OFFICE O/P EST MOD 30 MIN: CPT | Performed by: STUDENT IN AN ORGANIZED HEALTH CARE EDUCATION/TRAINING PROGRAM

## 2024-09-07 RX ORDER — BUPROPION HYDROCHLORIDE 150 MG/1
150 TABLET ORAL DAILY
Qty: 90 TABLET | Refills: 0 | Status: SHIPPED | OUTPATIENT
Start: 2024-09-07 | End: 2024-12-06

## 2024-09-07 RX ORDER — ARIPIPRAZOLE 5 MG/1
TABLET ORAL
Qty: 90 TABLET | Refills: 0 | Status: SHIPPED | OUTPATIENT
Start: 2024-09-07

## 2024-09-07 RX ORDER — LITHIUM CARBONATE 300 MG
600 TABLET ORAL DAILY
Qty: 180 TABLET | Refills: 0 | Status: SHIPPED | OUTPATIENT
Start: 2024-09-07 | End: 2024-12-06

## 2024-09-07 RX ORDER — LAMOTRIGINE 200 MG/1
TABLET ORAL
Qty: 90 TABLET | Refills: 0 | Status: SHIPPED | OUTPATIENT
Start: 2024-09-07

## 2024-09-07 RX ORDER — ATOMOXETINE 100 MG/1
CAPSULE ORAL
Qty: 90 CAPSULE | Refills: 0 | Status: SHIPPED | OUTPATIENT
Start: 2024-09-07

## 2024-09-07 NOTE — PSYCH
MEDICATION MANAGEMENT NOTE        [facility]      Name and Date of Birth:  Santana Norris 29 y.o. 1995 MRN: [id number]    Date of Visit: September 7, 2024    Reason for Visit: Follow-up visit for medication management     Virtual Visit Disclaimer:       TeleMed provider: Hazel Alcaraz D.O.    Location: [place]     Verification of patient location:     Patient is currently located in the state [facility]  Patient is currently located in a state in which I am licensed     After connecting through GeneCapture, the patient was identified by name and date of birth.  Santana Norris was informed that this is a telemedicine visit that is being conducted through [employer], and the patient was informed that this is a secure, HIPAA-compliant platform. My office door was closed. No one else was in the room. Santana Norris acknowledged consent and understanding of privacy and security of the video platform. Santana understands that the online visit is based solely on information provided by the patient, and that, in the absence of a face-to-face physical evaluation by the physician, the diagnosis Santana  receives is both limited and provisional in terms of accuracy and completeness. Santana Norris understands that they can discontinue the visit at any time. I informed Santana that I have reviewed their record in EPIC and presented the opportunity for them to ask any questions regarding the visit today. Santana Norris voiced understanding and consented to these terms. Santana is aware this is a billable service. Santana is present in [place]      SUBJECTIVE:    Santana Norris is a 29 y.o. male with past psychiatric history significant for  who was personally seen and evaluated today at the [facility] outpatient clinic for follow-up and medication management. Santana denies SI, HI, AVH, delusions, seth since our last appointment.  Patient states that he has  started to do cardio and feels overall much better with better mood.  After discussion of risks, benefits, potential side effects, alternatives, patient wishes to remain on current regimen as is without any changes.  He denies acute mental complaints or concerns at this time.        Current Rating Scores:     None completed today.    Review Of Systems:      Constitutional negative   ENT negative   Cardiovascular negative   Respiratory negative   Gastrointestinal negative   Genitourinary negative   Musculoskeletal negative   Integumentary negative   Neurological negative   Endocrine negative   Other Symptoms none, all other systems are negative       Past Psychiatric History: (unchanged information from previous note copied and italicized) - Information that is bolded has been updated.   See intake      Substance Abuse History: (unchanged information from previous note copied and italicized) - Information that is bolded has been updated.     See intake    Social History: (unchanged information from previous note copied and italicized) - Information that is bolded has been updated.     See intake    Traumatic History: (unchanged information from previous note copied and italicized) - Information that is bolded has been updated.     See intake      Past Medical History:    Past Medical History:   Diagnosis Date    Acute lateral meniscus tear of left knee     work injury occurred in December 2016    ADHD (attention deficit hyperactivity disorder)     since 4 y.o.    Anxiety     Bipolar disorder (HCC)     Complete heart block (HCC)     Secondary to Lyme disease    Depression     Lyme disease         Past Surgical History:   Procedure Laterality Date    ARTHROSCOPY KNEE Left 3/17/2016    Procedure: KNEE ARTHROSCOPY  WITH;  Surgeon: Lillian Mora MD;  Location: [facility] OR;  Service:     CARDIAC PACEMAKER PLACEMENT      temporary pacer for transient CHB    COLONOSCOPY      [place] ARTHRS KNE SURG W/MENISCECTOMY MED/LAT W/SHVG  Left 3/17/2016    Procedure: PARTIAL LATERAL MENISCECTOMY ;  Surgeon: Lillian Mora MD;  Location: [facility] OR;  Service: Orthopedics     No Known Allergies    Substance Abuse History:    Social History     Substance and Sexual Activity   Alcohol Use Yes    Comment: on occasion     Social History     Substance and Sexual Activity   Drug Use No       Social History:    Social History     Socioeconomic History    Marital status: Single     Spouse name: Not on file    Number of children: Not on file    Years of education: Not on file    Highest education level: Not on file   Occupational History    Not on file   Tobacco Use    Smoking status: Former    Smokeless tobacco: Never    Tobacco comments:     occ. cigar since 5 yrs.    Substance and Sexual Activity    Alcohol use: Yes     Comment: on occasion    Drug use: No    Sexual activity: Not on file   Other Topics Concern    Not on file   Social History Narrative    Not on file     Social Determinants of Health     Financial Resource Strain: Not on file   Food Insecurity: Not on file   Transportation Needs: Not on file   Physical Activity: Not on file   Stress: Not on file   Social Connections: Not on file   Intimate Partner Violence: Not on file   Housing Stability: Not on file       Family Psychiatric History:     Family History   Problem Relation Age of Onset    Diabetes Mother     Hypertension Mother     Diabetes Father     Hypertension Father     Bipolar disorder Sister     Anxiety disorder Sister        History Review: The following portions of the patient's history were reviewed and updated as appropriate: allergies, current medications, past family history, past medical history, past social history, past surgical history, and problem list.         OBJECTIVE:     Vital signs in last 24 hours:    There were no vitals filed for this visit.    Mental Status Evaluation:    Appearance age appropriate, casually dressed   Behavior cooperative, calm   Speech normal rate,  normal volume, normal pitch   Mood normal   Affect constricted   Thought Processes organized, goal directed   Associations intact associations   Thought Content no overt delusions   Perceptual Disturbances: no auditory hallucinations, no visual hallucinations   Abnormal Thoughts  Risk Potential Suicidal ideation - None  Homicidal ideation - None  Potential for aggression - No   Orientation oriented to person, place, time/date, and situation   Memory recent and remote memory grossly intact   Consciousness alert and awake   Attention Span Concentration Span attention span and concentration are age appropriate   Intellect appears to be of average intelligence   Insight intact   Judgement intact   Muscle Strength and  Gait unable to assess today due to virtual visit   Motor activity unable to assess today due to virtual visit   Language no difficulty naming common objects, no difficulty repeating a phrase   Fund of Knowledge adequate knowledge of current events  adequate fund of knowledge regarding past history  adequate fund of knowledge regarding vocabulary    Pain none   Pain Scale Did not ask patient to formally rate       Laboratory Results: I have personally reviewed all pertinent laboratory/tests results    Recent Labs (last 2 months):   No visits with results within 2 Month(s) from this visit.   Latest known visit with results is:   Documentation on 01/21/2022   Component Date Value    WBC 01/04/2022 7.90     Hemoglobin 01/04/2022 16.1     Hematocrit 01/04/2022 50.1 (A)     Platelets 01/04/2022 258     SODIUM 01/04/2022 140     POTASSIUM 01/04/2022 5.2     CHLORIDE 01/04/2022 102     CO2 01/04/2022 26     ANION GAP 01/04/2022 12     BUN 01/04/2022 29     CREATININE 01/04/2022 1.32     Glucose 01/04/2022 109     EXTERNAL CALCIUM 01/04/2022 10.5     TOTAL PROTEIN 01/04/2022 7.8     ALBUMIN 01/04/2022 5.0     AST 01/04/2022 46     ALT 01/04/2022 40     ALK PHOS 01/04/2022 74     EXTERNAL TOT. BILIRUBIN 01/04/2022  0.3     EXTERNAL EGFR 01/04/2022 >60     Lithium Lvl 01/04/2022 0.6     TSH 01/04/2022 1.510        Suicide/Homicide Risk Assessment:    Risk of Harm to Self:  The following ratings are based on assessment at the time of the interview  Historical Risk Factors include: chronic psychiatric problems  Protective Factors: no current suicidal ideation, compliant with mental health treatment, good self-esteem, having a desire to be alive, stable living environment, stable job, strong relationships    Risk of Harm to Others:  The following ratings are based on assessment at the time of the interview  Historical Risk Factors include: history of aggressive behavior.  Protective Factors: no current homicidal ideation, compliant with mental health treatment, good self-esteem, safe and stable living environment    The following interventions are recommended: contracts for safety at present - agrees to go to ED if feeling unsafe, contracts for safety at present - agrees to call Crisis Intervention Service if feeling unsafe      Lethality Statement:    Based on today's assessment and clinical criteria, Santana Norris contracts for safety and is not an imminent risk of harm to self or others. Outpatient level of care is deemed appropriate at this current time. Santana understands that if they can no longer contract for safety, they need to call the office or report to their nearest Emergency Room for immediate evaluation. They voiced understanding and agreement to call 911 or head to the nearest ED should they have any physical or mental decompensation whatsoever.       Assessment/Plan:        1.)  IED  2.)  JOAO  3.)  Rule out BPAD    After discussion of risks, benefits, and side effects, alternatives, we will continue current regimen of Abilify 5 mg/day, Strattera 100 mg/day, Lamictal 200 mg/day, lithium 300 mg twice daily, bupropion 150 mg daily.  Patient will obtain lithium level prior to our next  appointment.          Medications Risks/Benefits      Risks, Benefits And Possible Side Effects Of Medications:    Risks, benefits, and possible side effects of medications explained to Santana and he verbalizes understanding and agreement for treatment.    Controlled Medication Discussion:     Not applicable    Psychotherapy Provided:     Individual psychotherapy provided: Crisis/safety plan discussed with Santana.     Treatment Plan:    Completed and signed during the session:  We will perform in next appointment due to lack of time today      Visit Time    Visit Start Time: 4:30 PM  Visit Stop Time: 4:45 PM  Total Visit Duration:  15 minutes     The total visit duration detailed above includes: patient engagement, medication management, psychotherapy/counseling, discussion regarding treatment goals, documentation, review of past medical records, and coordination of care.      Note Share Disclaimer:     This note was not shared with the patient due to reasonable likelihood of causing patient harm      Hazel Alcaraz DO  Psychiatry  09/07/24

## 2024-09-16 ENCOUNTER — TELEPHONE (OUTPATIENT)
Dept: PSYCHIATRY | Facility: CLINIC | Age: 29
End: 2024-09-16

## 2024-09-16 NOTE — TELEPHONE ENCOUNTER
TC attempted; message left to remind Patient to have blood work done for a Lithium level as ordered. Call- back number provided for questions or concerns.

## 2024-09-18 ENCOUNTER — TELEPHONE (OUTPATIENT)
Dept: PSYCHIATRY | Facility: CLINIC | Age: 29
End: 2024-09-18

## 2024-09-18 NOTE — TELEPHONE ENCOUNTER
TC atempted/message left to encourage Santana to go for labs as ordered. Return phone number provided for questions or concerns.

## 2024-10-14 ENCOUNTER — TELEPHONE (OUTPATIENT)
Dept: PSYCHIATRY | Facility: CLINIC | Age: 29
End: 2024-10-14

## 2024-10-14 NOTE — TELEPHONE ENCOUNTER
Santana was transferred to this writer from the [place] center. He is requesting his lithium lab order be faxed to [facility] because [facility] [place] labs close before he gets out of work. Fax number is [phone]. He is also requesting a rx of his lithium. Forwarding to provider for review. Nursing will fax over slip.

## 2024-10-15 NOTE — TELEPHONE ENCOUNTER
Called pharmacy and confirmed that they have refills for Santana's Lamictal.  Pharmacy also stated that Santana should also have enough medication left for a couple of weeks from previous fill.    Called Santana and informed him that he still has a refill remaining at pharmacy and should still have medication left from previous fill.  Instructed him to call pharmacy if he has any questions.

## 2024-10-15 NOTE — TELEPHONE ENCOUNTER
It looks like he had been given 3-month supplies of all of his medications at all last appointment.  He should double check with his pharmacy to see if he actually needs another order

## 2024-12-26 ENCOUNTER — TELEPHONE (OUTPATIENT)
Dept: PSYCHIATRY | Facility: CLINIC | Age: 29
End: 2024-12-26

## 2024-12-26 NOTE — TELEPHONE ENCOUNTER
Patient called in to make sure their provider has their updated cell phone number for their medication management appointment today, 12/26/24 @4:30pm    [phone]    Writer updated it in the chart and informed the provider.    Provider stated that their is paperwork that needs to be completed first.    Patient stated they will look for that email and sign the paperwork now.    Patient was having trouble with their email and opted for the appointment to be rescheduled.    Patient is now rescheduled for tomorrow, 12/27/24 @2pm virtually and will call the office if there is trouble completing the paper work.    Provider and office staff were notified.

## 2024-12-26 NOTE — TELEPHONE ENCOUNTER
Writer called and managed to get through to client's voicemail. Writer informed client that calls are having trouble going through and gave very detailed instructions on where to find the forms if they do not prompt on the E-Check in via [employer]cio or using [employer] through the browser.    Writer did notify client that today's appointment will likely have to be rescheduled at this point as it is unlikely to be ready prior to the cut-off time.     Writer informed provider of situation.

## 2024-12-27 ENCOUNTER — TELEMEDICINE (OUTPATIENT)
Dept: PSYCHIATRY | Facility: CLINIC | Age: 29
End: 2024-12-27
Payer: COMMERCIAL

## 2024-12-27 DIAGNOSIS — F31.0 BIPOLAR I DISORDER, MOST RECENT EPISODE HYPOMANIC (HCC): ICD-10-CM

## 2024-12-27 DIAGNOSIS — F32.A DEPRESSION, UNSPECIFIED DEPRESSION TYPE: ICD-10-CM

## 2024-12-27 DIAGNOSIS — F90.2 ATTENTION DEFICIT HYPERACTIVITY DISORDER, COMBINED TYPE: ICD-10-CM

## 2024-12-27 PROCEDURE — 99214 OFFICE O/P EST MOD 30 MIN: CPT | Performed by: STUDENT IN AN ORGANIZED HEALTH CARE EDUCATION/TRAINING PROGRAM

## 2024-12-31 RX ORDER — ARIPIPRAZOLE 5 MG/1
TABLET ORAL
Qty: 90 TABLET | Refills: 0 | Status: SHIPPED | OUTPATIENT
Start: 2024-12-31

## 2024-12-31 RX ORDER — BUPROPION HYDROCHLORIDE 300 MG/1
300 TABLET ORAL DAILY
Qty: 30 TABLET | Refills: 0 | Status: SHIPPED | OUTPATIENT
Start: 2024-12-31

## 2024-12-31 RX ORDER — LAMOTRIGINE 200 MG/1
TABLET ORAL
Qty: 30 TABLET | Refills: 2 | Status: SHIPPED | OUTPATIENT
Start: 2024-12-31

## 2024-12-31 RX ORDER — LAMOTRIGINE 200 MG/1
TABLET ORAL
Qty: 90 TABLET | Refills: 0 | Status: SHIPPED | OUTPATIENT
Start: 2024-12-31 | End: 2024-12-31 | Stop reason: SDUPTHER

## 2024-12-31 RX ORDER — LITHIUM CARBONATE 300 MG
600 TABLET ORAL DAILY
Qty: 180 TABLET | Refills: 0 | Status: SHIPPED | OUTPATIENT
Start: 2024-12-31 | End: 2024-12-31 | Stop reason: SDUPTHER

## 2024-12-31 RX ORDER — LITHIUM CARBONATE 300 MG
600 TABLET ORAL DAILY
Qty: 60 TABLET | Refills: 2 | Status: SHIPPED | OUTPATIENT
Start: 2024-12-31 | End: 2025-03-31

## 2024-12-31 RX ORDER — ATOMOXETINE 100 MG/1
CAPSULE ORAL
Qty: 30 CAPSULE | Refills: 2 | Status: SHIPPED | OUTPATIENT
Start: 2024-12-31

## 2024-12-31 NOTE — PSYCH
MEDICATION MANAGEMENT NOTE        [facility] [place] [facility]      Name and Date of Birth:  Santana Norris 29 y.o. 1995 MRN: [id number]    Date of Visit: December 31, 2024    Reason for Visit: Follow-up visit for medication management     Virtual Visit Disclaimer:       TeleMed provider: Hazel Alcaraz D.O.    Location: [place]     Verification of patient location:     Patient is currently located in the state [facility]  Patient is currently located in a state in which I am licensed     After connecting through [employer], the patient was identified by name and date of birth.  Santana Norris was informed that this is a telemedicine visit that is being conducted through [email or web address], and the patient was informed that this is a secure, HIPAA-compliant platform. My office door was closed. No one else was in the room. Santana Norris acknowledged consent and understanding of privacy and security of the video platform. Santana understands that the online visit is based solely on information provided by the patient, and that, in the absence of a face-to-face physical evaluation by the physician, the diagnosis Santana  receives is both limited and provisional in terms of accuracy and completeness. Santana Norris understands that they can discontinue the visit at any time. I informed Santana that I have reviewed their record in EPIC and presented the opportunity for them to ask any questions regarding the visit today. Santana Norris voiced understanding and consented to these terms. Santana is aware this is a billable service. Santana is present in [place]      SUBJECTIVE:    Santana Norris is a 29 y.o. male with past psychiatric history significant for  who was personally seen and evaluated today at the [facility] outpatient clinic for follow-up and medication management. Santana denies SI, HI, AVH, delusions, seth since our last appointment.  Patient notes intermittent  feelings of dysphoria and stated that he had run out of his Wellbutrin but had not requested a refill.  After discussion of risks, benefits, potential side effects, alternatives, we will reinitiate patient on Wellbutrin and meet in 1 month for potential increased to 450 mg/day.  He denies acute mental complaints or concerns at this time      Current Rating Scores:     None completed today.    Review Of Systems:      Constitutional negative   ENT negative   Cardiovascular negative   Respiratory negative   Gastrointestinal negative   Genitourinary negative   Musculoskeletal negative   Integumentary negative   Neurological negative   Endocrine negative   Other Symptoms none, all other systems are negative       Past Psychiatric History: (unchanged information from previous note copied and italicized) - Information that is bolded has been updated.   See intake      Substance Abuse History: (unchanged information from previous note copied and italicized) - Information that is bolded has been updated.     See intake    Social History: (unchanged information from previous note copied and italicized) - Information that is bolded has been updated.     See intake    Traumatic History: (unchanged information from previous note copied and italicized) - Information that is bolded has been updated.     See intake      Past Medical History:    Past Medical History:   Diagnosis Date    Acute lateral meniscus tear of left knee     work injury occurred in December 2016    ADHD (attention deficit hyperactivity disorder)     since 4 y.o.    Anxiety     Bipolar disorder (HCC)     Complete heart block (HCC)     Secondary to Lyme disease    Depression     Lyme disease         Past Surgical History:   Procedure Laterality Date    ARTHROSCOPY KNEE Left 3/17/2016    Procedure: KNEE ARTHROSCOPY  WITH;  Surgeon: Lillian Mora MD;  Location: [facility] OR;  Service:     CARDIAC PACEMAKER PLACEMENT      temporary pacer for transient CHB     COLONOSCOPY      WA ARTHRS KNE SURG W/MENISCECTOMY MED/LAT W/SHVG Left 3/17/2016    Procedure: PARTIAL LATERAL MENISCECTOMY ;  Surgeon: Lillian Mora MD;  Location:  MAIN OR;  Service: Orthopedics     No Known Allergies    Substance Abuse History:    Social History     Substance and Sexual Activity   Alcohol Use Yes    Comment: on occasion     Social History     Substance and Sexual Activity   Drug Use No       Social History:    Social History     Socioeconomic History    Marital status: Single     Spouse name: Not on file    Number of children: Not on file    Years of education: Not on file    Highest education level: Not on file   Occupational History    Not on file   Tobacco Use    Smoking status: Former    Smokeless tobacco: Never    Tobacco comments:     occ. cigar since 5 yrs.    Substance and Sexual Activity    Alcohol use: Yes     Comment: on occasion    Drug use: No    Sexual activity: Not on file   Other Topics Concern    Not on file   Social History Narrative    Not on file     Social Drivers of Health     Financial Resource Strain: Not on file   Food Insecurity: Not on file   Transportation Needs: Not on file   Physical Activity: Not on file   Stress: Not on file   Social Connections: Not on file   Intimate Partner Violence: Not on file   Housing Stability: Not on file       Family Psychiatric History:     Family History   Problem Relation Age of Onset    Diabetes Mother     Hypertension Mother     Diabetes Father     Hypertension Father     Bipolar disorder Sister     Anxiety disorder Sister        History Review: The following portions of the patient's history were reviewed and updated as appropriate: allergies, current medications, past family history, past medical history, past social history, past surgical history, and problem list.         OBJECTIVE:     Vital signs in last 24 hours:    There were no vitals filed for this visit.    Mental Status Evaluation:    Appearance age appropriate, casually  dressed   Behavior cooperative, calm   Speech normal rate, normal volume, normal pitch   Mood normal   Affect constricted   Thought Processes organized, goal directed   Associations intact associations   Thought Content no overt delusions   Perceptual Disturbances: no auditory hallucinations, no visual hallucinations   Abnormal Thoughts  Risk Potential Suicidal ideation - None  Homicidal ideation - None  Potential for aggression - No   Orientation oriented to person, place, time/date, and situation   Memory recent and remote memory grossly intact   Consciousness alert and awake   Attention Span Concentration Span attention span and concentration are age appropriate   Intellect appears to be of average intelligence   Insight intact   Judgement intact   Muscle Strength and  Gait unable to assess today due to virtual visit   Motor activity unable to assess today due to virtual visit   Language no difficulty naming common objects, no difficulty repeating a phrase   Fund of Knowledge adequate knowledge of current events  adequate fund of knowledge regarding past history  adequate fund of knowledge regarding vocabulary    Pain none   Pain Scale Did not ask patient to formally rate       Laboratory Results: I have personally reviewed all pertinent laboratory/tests results    Recent Labs (last 2 months):   No visits with results within 2 Month(s) from this visit.   Latest known visit with results is:   Documentation on 01/21/2022   Component Date Value    WBC 01/04/2022 7.90     Hemoglobin 01/04/2022 16.1     Hematocrit 01/04/2022 50.1 (A)     Platelets 01/04/2022 258     SODIUM 01/04/2022 140     POTASSIUM 01/04/2022 5.2     CHLORIDE 01/04/2022 102     CO2 01/04/2022 26     ANION GAP 01/04/2022 12     BUN 01/04/2022 29     CREATININE 01/04/2022 1.32     Glucose 01/04/2022 109     EXTERNAL CALCIUM 01/04/2022 10.5     TOTAL PROTEIN 01/04/2022 7.8     ALBUMIN 01/04/2022 5.0     AST 01/04/2022 46     ALT 01/04/2022 40     ALK  PHOS 01/04/2022 74     EXTERNAL TOT. BILIRUBIN 01/04/2022 0.3     EXTERNAL EGFR 01/04/2022 >60     Lithium Lvl 01/04/2022 0.6     TSH 01/04/2022 1.510        Suicide/Homicide Risk Assessment:    Risk of Harm to Self:  The following ratings are based on assessment at the time of the interview  Historical Risk Factors include: chronic psychiatric problems  Protective Factors: no current suicidal ideation, compliant with mental health treatment, good self-esteem, having a desire to be alive, stable living environment, stable job, strong relationships    Risk of Harm to Others:  The following ratings are based on assessment at the time of the interview  Historical Risk Factors include: history of aggressive behavior.  Protective Factors: no current homicidal ideation, compliant with mental health treatment, good self-esteem, safe and stable living environment    The following interventions are recommended: contracts for safety at present - agrees to go to ED if feeling unsafe, contracts for safety at present - agrees to call Crisis Intervention Service if feeling unsafe      Lethality Statement:    Based on today's assessment and clinical criteria, Santana Norris contracts for safety and is not an imminent risk of harm to self or others. Outpatient level of care is deemed appropriate at this current time. Santana understands that if they can no longer contract for safety, they need to call the office or report to their nearest Emergency Room for immediate evaluation. They voiced understanding and agreement to call 911 or head to the nearest ED should they have any physical or mental decompensation whatsoever.       Assessment/Plan:        1.)  IED  2.)  JOAO  3.)  Rule out BPAD    After discussion of risks, benefits, and side effects, alternatives, we will continue current regimen of Abilify 5 mg/day, Strattera 100 mg/day, Lamictal 200 mg/day, lithium 300 mg twice daily, Wellbutrin 300 mg daily.  Patient denies acute  mental complaints or concerns at this time        Medications Risks/Benefits      Risks, Benefits And Possible Side Effects Of Medications:    Risks, benefits, and possible side effects of medications explained to Santana and he verbalizes understanding and agreement for treatment.    Controlled Medication Discussion:     Not applicable    Psychotherapy Provided:     Individual psychotherapy provided: Crisis/safety plan discussed with Santana.     Treatment Plan:    Completed and signed during the session:  We will perform in next appointment due to lack of time today      Visit Time    Visit Start Time: 2:00 PM  Visit Stop Time: 2:20 PM  Total Visit Duration:  20 minutes     The total visit duration detailed above includes: patient engagement, medication management, psychotherapy/counseling, discussion regarding treatment goals, documentation, review of past medical records, and coordination of care.      Note Share Disclaimer:     This note was not shared with the patient due to reasonable likelihood of causing patient harm      Hazel Alcaraz DO  Psychiatry  12/31/24

## 2025-02-13 ENCOUNTER — NURSE TRIAGE (OUTPATIENT)
Dept: OTHER | Facility: OTHER | Age: 30
End: 2025-02-13

## 2025-02-13 NOTE — TELEPHONE ENCOUNTER
Patient calling-    Reports he needs to reschedule his appt.    Also requesting refills for Welbutrin, Strattera, Abilify and Lamictal.      Will forward to office to help facilitate patients requests. Thank you.

## 2025-02-13 NOTE — TELEPHONE ENCOUNTER
"Reason for Disposition   Caller requesting a CONTROLLED substance prescription refill (e.g., narcotics, ADHD medicines)    Answer Assessment - Initial Assessment Questions  1. DRUG NAME: \"What medicine do you need to have refilled?\"      Welbutrin, Abilify, Lamictal, Strattera    4. PRESCRIBING HCP: \"Who prescribed it?\" Reason: If prescribed by specialist, call should be referred to that group.      [facility]    Protocols used: Medication Refill and Renewal Call-Adult-    "

## 2025-02-14 ENCOUNTER — TELEPHONE (OUTPATIENT)
Age: 30
End: 2025-02-14

## 2025-02-14 DIAGNOSIS — F31.0 BIPOLAR I DISORDER, MOST RECENT EPISODE HYPOMANIC (HCC): ICD-10-CM

## 2025-02-14 DIAGNOSIS — F32.A DEPRESSION, UNSPECIFIED DEPRESSION TYPE: ICD-10-CM

## 2025-02-14 DIAGNOSIS — F90.2 ATTENTION DEFICIT HYPERACTIVITY DISORDER, COMBINED TYPE: ICD-10-CM

## 2025-02-14 RX ORDER — LAMOTRIGINE 200 MG/1
TABLET ORAL
Qty: 30 TABLET | Refills: 2 | Status: CANCELLED | OUTPATIENT
Start: 2025-02-14

## 2025-02-14 RX ORDER — ATOMOXETINE 100 MG/1
CAPSULE ORAL
Qty: 30 CAPSULE | Refills: 2 | Status: CANCELLED | OUTPATIENT
Start: 2025-02-14

## 2025-02-14 RX ORDER — ARIPIPRAZOLE 5 MG/1
TABLET ORAL
Qty: 90 TABLET | Refills: 0 | Status: CANCELLED | OUTPATIENT
Start: 2025-02-14

## 2025-02-14 RX ORDER — BUPROPION HYDROCHLORIDE 300 MG/1
300 TABLET ORAL DAILY
Qty: 30 TABLET | Refills: 0 | Status: CANCELLED | OUTPATIENT
Start: 2025-02-14

## 2025-02-14 NOTE — TELEPHONE ENCOUNTER
Spoke with the pharmacist at [facility].     -Atomoxetine and lamotrigine refills available. They will prepare the medication.  -Aripiprazole was filled for #90 last and should have 45 tabs remaining.   These Reorders canceled in the Encounter.    Bupropion does need a refill.     For Provider review.     Spoke with Santana and reviewed same.

## 2025-02-14 NOTE — TELEPHONE ENCOUNTER
Patient contacted the office to schedule a follow up visit with provider. Patient is now scheduled for 3/6/25  at 5:30 pm virtually.

## 2025-02-14 NOTE — TELEPHONE ENCOUNTER
1.Medication Refill Request   Name of Medication ARIPiprazole (ABILIFY) 5 mg tablet  Dose/Frequency Take 1 PO Q HS  Quantity 90  Verified pharmacy   [x]  Verified ordering Provider   [x]  Does patient have enough for the next 3 days? Yes [x] No []  Does patient have a follow-up appointment scheduled? Yes [x] No []  If so when is appointment: 3/6/25    2.Medication Refill Request   Name of Medication atomoxetine (STRATTERA) 100 MG capsule  Dose/Frequency take 1 capsule by mouth daily  Quantity 30  Verified pharmacy   [x]  Verified ordering Provider   [x]  Does patient have enough for the next 3 days? Yes [x] No []  Does patient have a follow-up appointment scheduled? Yes [x] No []  If so when is appointment: 3/6/25    3.Medication Refill Request   Name of Medication buPROPion (Wellbutrin XL) 300 mg 24 hr tablet  Dose/Frequency Take 1 tablet (300 mg total) by mouth daily  Quantity 30  Verified pharmacy   [x]  Verified ordering Provider   [x]  Does patient have enough for the next 3 days? Yes [x] No []  Does patient have a follow-up appointment scheduled? Yes [x] No []  If so when is appointment: 3/6/25    4.Medication Refill Request   Name of Medication lamoTRIgine (LaMICtal) 200 MG tablet  Dose/Frequency Take 1 PO Q HS  Quantity 30  Verified pharmacy   [x]  Verified ordering Provider   [x]  Does patient have enough for the next 3 days? Yes [x] No []  Does patient have a follow-up appointment scheduled? Yes [x] No []  If so when is appointment: 3/6/25

## 2025-02-21 DIAGNOSIS — F31.0 BIPOLAR I DISORDER, MOST RECENT EPISODE HYPOMANIC (HCC): ICD-10-CM

## 2025-02-21 DIAGNOSIS — F90.2 ATTENTION DEFICIT HYPERACTIVITY DISORDER, COMBINED TYPE: ICD-10-CM

## 2025-02-21 DIAGNOSIS — F32.A DEPRESSION, UNSPECIFIED DEPRESSION TYPE: ICD-10-CM

## 2025-02-21 RX ORDER — LAMOTRIGINE 200 MG/1
TABLET ORAL
Qty: 30 TABLET | Refills: 2 | Status: SHIPPED | OUTPATIENT
Start: 2025-02-21

## 2025-02-21 RX ORDER — ARIPIPRAZOLE 5 MG/1
TABLET ORAL
Qty: 90 TABLET | Refills: 0 | Status: SHIPPED | OUTPATIENT
Start: 2025-02-21

## 2025-02-21 RX ORDER — BUPROPION HYDROCHLORIDE 300 MG/1
300 TABLET ORAL DAILY
Qty: 30 TABLET | Refills: 0 | Status: SHIPPED | OUTPATIENT
Start: 2025-02-21

## 2025-02-21 RX ORDER — ATOMOXETINE 100 MG/1
CAPSULE ORAL
Qty: 30 CAPSULE | Refills: 2 | Status: SHIPPED | OUTPATIENT
Start: 2025-02-21

## 2025-02-21 RX ORDER — LITHIUM CARBONATE 300 MG
600 TABLET ORAL DAILY
Qty: 60 TABLET | Refills: 2 | Status: SHIPPED | OUTPATIENT
Start: 2025-02-21 | End: 2025-05-22

## 2025-03-06 ENCOUNTER — TELEMEDICINE (OUTPATIENT)
Dept: PSYCHIATRY | Facility: CLINIC | Age: 30
End: 2025-03-06

## 2025-03-06 DIAGNOSIS — Z79.899 MEDICATION COURSE CHANGED: Primary | ICD-10-CM

## 2025-03-06 DIAGNOSIS — F32.A DEPRESSION, UNSPECIFIED DEPRESSION TYPE: ICD-10-CM

## 2025-03-06 DIAGNOSIS — F31.0 BIPOLAR I DISORDER, MOST RECENT EPISODE HYPOMANIC (HCC): ICD-10-CM

## 2025-03-06 RX ORDER — LAMOTRIGINE 200 MG/1
TABLET ORAL
Qty: 90 TABLET | Refills: 0 | Status: SHIPPED | OUTPATIENT
Start: 2025-03-06

## 2025-03-06 RX ORDER — LITHIUM CARBONATE 300 MG
600 TABLET ORAL DAILY
Qty: 60 TABLET | Refills: 2 | Status: SHIPPED | OUTPATIENT
Start: 2025-03-06 | End: 2025-03-06

## 2025-03-06 RX ORDER — ARIPIPRAZOLE 5 MG/1
TABLET ORAL
Qty: 90 TABLET | Refills: 0 | Status: SHIPPED | OUTPATIENT
Start: 2025-03-06

## 2025-03-06 RX ORDER — BUPROPION HYDROCHLORIDE 300 MG/1
300 TABLET ORAL DAILY
Qty: 90 TABLET | Refills: 0 | Status: SHIPPED | OUTPATIENT
Start: 2025-03-06 | End: 2025-06-04

## 2025-03-06 RX ORDER — LITHIUM CARBONATE 300 MG
600 TABLET ORAL DAILY
Qty: 60 TABLET | Refills: 2 | Status: SHIPPED | OUTPATIENT
Start: 2025-03-06 | End: 2025-06-04

## 2025-03-07 NOTE — PSYCH
MEDICATION MANAGEMENT NOTE        [facility]      Name and Date of Birth:  Santana Norris 29 y.o. 1995 MRN: [id number]    Date of Visit: March 7, 2025    Reason for Visit: Follow-up visit for medication management       Administrative Statements   Encounter provider Hazel Alcaraz DO    The Patient is located at Home and in the following state in which I hold an active license [place].    The patient was identified by name and date of birth.  Santana Norris was informed that this is a telemedicine visit and that the visit is being conducted through the Epic Embedded platform. They agree to proceed..  My office door was closed. No one else was in the room.  They acknowledged consent and understanding of privacy and security of the video platform. The patient has agreed to participate and understands they can discontinue the visit at any time.    I have spent a total time of 25 minutes in caring for this patient on the day of the visit/encounter including Diagnostic results, Prognosis, Risks and benefits of tx options, Instructions for management, Patient and family education, Importance of tx compliance, Risk factor reductions, Impressions, Counseling / Coordination of care, Documenting in the medical record, Reviewing/placing orders in the medical record (including tests, medications, and/or procedures), and Obtaining or reviewing history  , not including the time spent for establishing the audio/video connection.       SUBJECTIVE:    Santana Norris is a 29 y.o. male with past psychiatric history significant for intermittent explosive disorder, generalized anxiety disorder, possible bipolar disorder who was personally seen and evaluated today at the [facility] outpatient clinic for follow-up and medication management. Santana denies SI, HI, AVH, delusions, seth since our last appointment.  After discussion of risks, benefits, potential side  effects, alternatives, he wishes to remain on current regimen as is without any changes due to its effectiveness.  He denies acute mental complaints concerns at this time    Current Rating Scores:     None completed today.    Review Of Systems:      Constitutional negative   ENT negative   Cardiovascular negative   Respiratory negative   Gastrointestinal negative   Genitourinary negative   Musculoskeletal negative   Integumentary negative   Neurological negative   Endocrine negative   Other Symptoms none, all other systems are negative       Past Psychiatric History: (unchanged information from previous note copied and italicized) - Information that is bolded has been updated.   See intake      Substance Abuse History: (unchanged information from previous note copied and italicized) - Information that is bolded has been updated.     See intake    Social History: (unchanged information from previous note copied and italicized) - Information that is bolded has been updated.     See intake    Traumatic History: (unchanged information from previous note copied and italicized) - Information that is bolded has been updated.     See intake      Past Medical History:    Past Medical History:   Diagnosis Date    Acute lateral meniscus tear of left knee     work injury occurred in December 2016    ADHD (attention deficit hyperactivity disorder)     since 4 y.o.    Anxiety     Bipolar disorder (HCC)     Complete heart block (HCC)     Secondary to Lyme disease    Depression     Lyme disease         Past Surgical History:   Procedure Laterality Date    ARTHROSCOPY KNEE Left 3/17/2016    Procedure: KNEE ARTHROSCOPY  WITH;  Surgeon: Lillian Mora MD;  Location:  MAIN OR;  Service:     CARDIAC PACEMAKER PLACEMENT      temporary pacer for transient CHB    COLONOSCOPY      FL ARTHRS KNE SURG W/MENISCECTOMY MED/LAT W/SHVG Left 3/17/2016    Procedure: PARTIAL LATERAL MENISCECTOMY ;  Surgeon: Lillian Mora MD;  Location:  MAIN OR;   Service: Orthopedics     No Known Allergies    Substance Abuse History:    Social History     Substance and Sexual Activity   Alcohol Use Yes    Comment: on occasion     Social History     Substance and Sexual Activity   Drug Use No       Social History:    Social History     Socioeconomic History    Marital status: Single     Spouse name: Not on file    Number of children: Not on file    Years of education: Not on file    Highest education level: Not on file   Occupational History    Not on file   Tobacco Use    Smoking status: Former    Smokeless tobacco: Never    Tobacco comments:     occ. cigar since 5 yrs.    Substance and Sexual Activity    Alcohol use: Yes     Comment: on occasion    Drug use: No    Sexual activity: Not on file   Other Topics Concern    Not on file   Social History Narrative    Not on file     Social Drivers of Health     Financial Resource Strain: Not on file   Food Insecurity: Not on file   Transportation Needs: Not on file   Physical Activity: Not on file   Stress: Not on file   Social Connections: Not on file   Intimate Partner Violence: Not on file   Housing Stability: Not on file       Family Psychiatric History:     Family History   Problem Relation Age of Onset    Diabetes Mother     Hypertension Mother     Diabetes Father     Hypertension Father     Bipolar disorder Sister     Anxiety disorder Sister        History Review: The following portions of the patient's history were reviewed and updated as appropriate: allergies, current medications, past family history, past medical history, past social history, past surgical history, and problem list.         OBJECTIVE:     Vital signs in last 24 hours:    There were no vitals filed for this visit.    Mental Status Evaluation:    Appearance age appropriate, casually dressed   Behavior cooperative, calm   Speech normal rate, normal volume, normal pitch   Mood normal   Affect constricted   Thought Processes organized, goal directed    Associations intact associations   Thought Content no overt delusions   Perceptual Disturbances: no auditory hallucinations, no visual hallucinations   Abnormal Thoughts  Risk Potential Suicidal ideation - None  Homicidal ideation - None  Potential for aggression - No   Orientation oriented to person, place, time/date, and situation   Memory recent and remote memory grossly intact   Consciousness alert and awake   Attention Span Concentration Span attention span and concentration are age appropriate   Intellect appears to be of average intelligence   Insight intact   Judgement intact   Muscle Strength and  Gait unable to assess today due to virtual visit   Motor activity unable to assess today due to virtual visit   Language no difficulty naming common objects, no difficulty repeating a phrase   Fund of Knowledge adequate knowledge of current events  adequate fund of knowledge regarding past history  adequate fund of knowledge regarding vocabulary    Pain none   Pain Scale Did not ask patient to formally rate       Laboratory Results: I have personally reviewed all pertinent laboratory/tests results    Recent Labs (last 2 months):   No visits with results within 2 Month(s) from this visit.   Latest known visit with results is:   Documentation on 01/21/2022   Component Date Value    WBC 01/04/2022 7.90     Hemoglobin 01/04/2022 16.1     Hematocrit 01/04/2022 50.1 (A)     Platelets 01/04/2022 258     SODIUM 01/04/2022 140     POTASSIUM 01/04/2022 5.2     CHLORIDE 01/04/2022 102     CO2 01/04/2022 26     ANION GAP 01/04/2022 12     BUN 01/04/2022 29     CREATININE 01/04/2022 1.32     Glucose 01/04/2022 109     EXTERNAL CALCIUM 01/04/2022 10.5     TOTAL PROTEIN 01/04/2022 7.8     ALBUMIN 01/04/2022 5.0     AST 01/04/2022 46     ALT 01/04/2022 40     ALK PHOS 01/04/2022 74     EXTERNAL TOT. BILIRUBIN 01/04/2022 0.3     EXTERNAL EGFR 01/04/2022 >60     Lithium Lvl 01/04/2022 0.6     TSH 01/04/2022 1.510         Suicide/Homicide Risk Assessment:    Risk of Harm to Self:  The following ratings are based on assessment at the time of the interview  Historical Risk Factors include: chronic psychiatric problems  Protective Factors: no current suicidal ideation, compliant with mental health treatment, good self-esteem, having a desire to be alive, stable living environment, stable job, strong relationships    Risk of Harm to Others:  The following ratings are based on assessment at the time of the interview  Historical Risk Factors include: history of aggressive behavior.  Protective Factors: no current homicidal ideation, compliant with mental health treatment, good self-esteem, safe and stable living environment    The following interventions are recommended: contracts for safety at present - agrees to go to ED if feeling unsafe, contracts for safety at present - agrees to call Crisis Intervention Service if feeling unsafe      Lethality Statement:    Based on today's assessment and clinical criteria, Santana Norris contracts for safety and is not an imminent risk of harm to self or others. Outpatient level of care is deemed appropriate at this current time. Santana understands that if they can no longer contract for safety, they need to call the office or report to their nearest Emergency Room for immediate evaluation. They voiced understanding and agreement to call 911 or head to the nearest ED should they have any physical or mental decompensation whatsoever.       Assessment/Plan:        1.)  IED  2.)  JOAO  3.)  Rule out BPAD    After discussion of risks, benefits, and side effects, alternatives, we will continue current regimen of Abilify 5 mg/day, Strattera 100 mg/day, Lamictal 200 mg/day, lithium 300 mg twice daily, Wellbutrin 300 mg daily.  Patient denies acute mental complaints or concerns at this time.  He will obtain lithium level prior to our next appointment given that his blood pressure medications are being  adjusted which may effected        Medications Risks/Benefits      Risks, Benefits And Possible Side Effects Of Medications:    Risks, benefits, and possible side effects of medications explained to Santana and he verbalizes understanding and agreement for treatment.    Controlled Medication Discussion:     Not applicable    Psychotherapy Provided:     Individual psychotherapy provided: Crisis/safety plan discussed with Santana.     Treatment Plan:    Completed and signed during the session:  We will perform in next appointment due to lack of time today      Visit Time    Visit Start Time: 5:30 PM  Visit Stop Time: 5:55 PM  Total Visit Duration:  25 minutes     The total visit duration detailed above includes: patient engagement, medication management, psychotherapy/counseling, discussion regarding treatment goals, documentation, review of past medical records, and coordination of care.      Note Share Disclaimer:     This note was not shared with the patient due to reasonable likelihood of causing patient harm      Hazel Alcaraz DO  Psychiatry  03/07/25

## 2025-03-27 ENCOUNTER — TELEPHONE (OUTPATIENT)
Dept: PSYCHIATRY | Facility: CLINIC | Age: 30
End: 2025-03-27

## 2025-03-27 NOTE — TELEPHONE ENCOUNTER
Called pt and left VM regarding scheduling 3mo f/u appt with Dr. Alcaraz.    Gave [facility] phone number to call back and schedule with provider.

## 2025-03-31 DIAGNOSIS — Z79.899 ENCOUNTER FOR LONG-TERM (CURRENT) USE OF MEDICATIONS: Primary | ICD-10-CM

## 2025-05-29 ENCOUNTER — TELEMEDICINE (OUTPATIENT)
Dept: PSYCHIATRY | Facility: CLINIC | Age: 30
End: 2025-05-29
Payer: COMMERCIAL

## 2025-05-29 DIAGNOSIS — F90.2 ATTENTION DEFICIT HYPERACTIVITY DISORDER, COMBINED TYPE: ICD-10-CM

## 2025-05-29 DIAGNOSIS — F63.81 INTERMITTENT EXPLOSIVE DISORDER: ICD-10-CM

## 2025-05-29 DIAGNOSIS — F31.0 BIPOLAR I DISORDER, MOST RECENT EPISODE HYPOMANIC (HCC): ICD-10-CM

## 2025-05-29 DIAGNOSIS — F32.A DEPRESSION, UNSPECIFIED DEPRESSION TYPE: ICD-10-CM

## 2025-05-29 DIAGNOSIS — Z79.899 MEDICATION COURSE CHANGED: Primary | ICD-10-CM

## 2025-05-29 PROCEDURE — 99214 OFFICE O/P EST MOD 30 MIN: CPT | Performed by: STUDENT IN AN ORGANIZED HEALTH CARE EDUCATION/TRAINING PROGRAM

## 2025-05-29 PROCEDURE — 90833 PSYTX W PT W E/M 30 MIN: CPT | Performed by: STUDENT IN AN ORGANIZED HEALTH CARE EDUCATION/TRAINING PROGRAM

## 2025-05-30 ENCOUNTER — TELEPHONE (OUTPATIENT)
Dept: PSYCHIATRY | Facility: CLINIC | Age: 30
End: 2025-05-30

## 2025-05-30 RX ORDER — ATOMOXETINE 100 MG/1
CAPSULE ORAL
Qty: 90 CAPSULE | Refills: 0 | Status: SHIPPED | OUTPATIENT
Start: 2025-05-30

## 2025-05-30 RX ORDER — LAMOTRIGINE 200 MG/1
TABLET ORAL
Qty: 90 TABLET | Refills: 0 | Status: SHIPPED | OUTPATIENT
Start: 2025-05-30

## 2025-05-30 RX ORDER — BUPROPION HYDROCHLORIDE 300 MG/1
300 TABLET ORAL DAILY
Qty: 90 TABLET | Refills: 0 | Status: SHIPPED | OUTPATIENT
Start: 2025-05-30 | End: 2025-08-28

## 2025-05-30 RX ORDER — LITHIUM CARBONATE 300 MG
600 TABLET ORAL DAILY
Qty: 60 TABLET | Refills: 2 | Status: SHIPPED | OUTPATIENT
Start: 2025-05-30 | End: 2025-08-28

## 2025-05-30 RX ORDER — ARIPIPRAZOLE 5 MG/1
TABLET ORAL
Qty: 90 TABLET | Refills: 0 | Status: SHIPPED | OUTPATIENT
Start: 2025-05-30

## 2025-05-30 NOTE — TELEPHONE ENCOUNTER
Called pt regarding scheduling 3mo f/u appt with Dr. Alcaraz. Unable to leave VM due to VM box not being set up.

## 2025-05-30 NOTE — PSYCH
MEDICATION MANAGEMENT NOTE        [facility]      Name and Date of Birth:  Santana Norris 30 y.o. 1995 MRN: [id number]    Date of Visit: May 30, 2025    Reason for Visit: Follow-up visit for medication management       Administrative Statements   Encounter provider Hazel Alcaraz DO    The Patient is located at Home and in the following state in which I hold an active license [place].    The patient was identified by name and date of birth. Santana Norris was informed that this is a telemedicine visit and that the visit is being conducted through the Epic Embedded platform. He agrees to proceed..  My office door was closed. No one else was in the room.  He acknowledged consent and understanding of privacy and security of the video platform. The patient has agreed to participate and understands they can discontinue the visit at any time.    I have spent a total time of 28 minutes in caring for this patient on the day of the visit/encounter including Diagnostic results, Prognosis, Risks and benefits of tx options, Instructions for management, Patient and family education, Importance of tx compliance, Risk factor reductions, Impressions, Counseling / Coordination of care, Documenting in the medical record, Reviewing/placing orders in the medical record (including tests, medications, and/or procedures), and Obtaining or reviewing history  , not including the time spent for establishing the audio/video connection.       SUBJECTIVE:    Santana Norris is a 30 y.o. male with past psychiatric history significant for intermittent explosive disorder, generalized anxiety disorder, possible bipolar disorder who was personally seen and evaluated today at the [facility] outpatient clinic for follow-up and medication management. Santana denies SI, HI, AVH, delusions, seth since our last appointment.  After discussion of risks, benefits, potential side effects,  alternatives, he wishes to remain on current regimen as is without any changes due to its effectiveness.  He denies acute mental complaints concerns at this time    Current Rating Scores:     None completed today.    Review Of Systems:      Constitutional negative   ENT negative   Cardiovascular negative   Respiratory negative   Gastrointestinal negative   Genitourinary negative   Musculoskeletal negative aside from some arm tenderness   Integumentary negative   Neurological negative   Endocrine negative   Other Symptoms none, all other systems are negative       Past Psychiatric History: (unchanged information from previous note copied and italicized) - Information that is bolded has been updated.   See intake      Substance Abuse History: (unchanged information from previous note copied and italicized) - Information that is bolded has been updated.     See intake    Social History: (unchanged information from previous note copied and italicized) - Information that is bolded has been updated.     See intake    Traumatic History: (unchanged information from previous note copied and italicized) - Information that is bolded has been updated.     See intake      Past Medical History:    Past Medical History:   Diagnosis Date    Acute lateral meniscus tear of left knee     work injury occurred in December 2016    ADHD (attention deficit hyperactivity disorder)     since 4 y.o.    Anxiety     Bipolar disorder (HCC)     Complete heart block (HCC)     Secondary to Lyme disease    Depression     Lyme disease         Past Surgical History:   Procedure Laterality Date    ARTHROSCOPY KNEE Left 3/17/2016    Procedure: KNEE ARTHROSCOPY  WITH;  Surgeon: Lillian Mora MD;  Location: [facility] OR;  Service:     CARDIAC PACEMAKER PLACEMENT      temporary pacer for transient CHB    COLONOSCOPY      [place] ARTHRS KNE SURG W/MENISCECTOMY MED/LAT W/SHVG Left 3/17/2016    Procedure: PARTIAL LATERAL MENISCECTOMY ;  Surgeon: Lillian Mora MD;   Location: [facility] OR;  Service: Orthopedics     No Known Allergies    Substance Abuse History:    Social History     Substance and Sexual Activity   Alcohol Use Yes    Comment: on occasion     Social History     Substance and Sexual Activity   Drug Use No       Social History:    Social History     Socioeconomic History    Marital status: Single     Spouse name: Not on file    Number of children: Not on file    Years of education: Not on file    Highest education level: Not on file   Occupational History    Not on file   Tobacco Use    Smoking status: Former    Smokeless tobacco: Never    Tobacco comments:     occ. cigar since 5 yrs.    Substance and Sexual Activity    Alcohol use: Yes     Comment: on occasion    Drug use: No    Sexual activity: Not on file   Other Topics Concern    Not on file   Social History Narrative    Not on file     Social Drivers of Health     Financial Resource Strain: Not on file   Food Insecurity: Not on file   Transportation Needs: Not on file   Physical Activity: Not on file   Stress: Not on file   Social Connections: Not on file   Intimate Partner Violence: Not on file   Housing Stability: Not on file       Family Psychiatric History:     Family History   Problem Relation Name Age of Onset    Diabetes Mother      Hypertension Mother      Diabetes Father      Hypertension Father      Bipolar disorder Sister      Anxiety disorder Sister         History Review: The following portions of the patient's history were reviewed and updated as appropriate: allergies, current medications, past family history, past medical history, past social history, past surgical history, and problem list.         OBJECTIVE:     Vital signs in last 24 hours:    There were no vitals filed for this visit.    Mental Status Evaluation:    Appearance age appropriate, casually dressed   Behavior cooperative, calm   Speech normal rate, normal volume, normal pitch   Mood normal   Affect constricted   Thought Processes  organized, goal directed   Associations intact associations   Thought Content no overt delusions   Perceptual Disturbances: no auditory hallucinations, no visual hallucinations   Abnormal Thoughts  Risk Potential Suicidal ideation - None  Homicidal ideation - None  Potential for aggression - No   Orientation oriented to person, place, time/date, and situation   Memory recent and remote memory grossly intact   Consciousness alert and awake   Attention Span Concentration Span attention span and concentration are age appropriate   Intellect appears to be of average intelligence   Insight intact   Judgement intact   Muscle Strength and  Gait unable to assess today due to virtual visit   Motor activity unable to assess today due to virtual visit   Language no difficulty naming common objects, no difficulty repeating a phrase   Fund of Knowledge adequate knowledge of current events  adequate fund of knowledge regarding past history  adequate fund of knowledge regarding vocabulary    Pain none   Pain Scale Did not ask patient to formally rate       Laboratory Results: I have personally reviewed all pertinent laboratory/tests results    Recent Labs (last 2 months):   No visits with results within 2 Month(s) from this visit.   Latest known visit with results is:   Documentation on 01/21/2022   Component Date Value    WBC 01/04/2022 7.90     Hemoglobin 01/04/2022 16.1     Hematocrit 01/04/2022 50.1 (A)     Platelets 01/04/2022 258     SODIUM 01/04/2022 140     POTASSIUM 01/04/2022 5.2     CHLORIDE 01/04/2022 102     CO2 01/04/2022 26     ANION GAP 01/04/2022 12     BUN 01/04/2022 29     CREATININE 01/04/2022 1.32     Glucose 01/04/2022 109     EXTERNAL CALCIUM 01/04/2022 10.5     TOTAL PROTEIN 01/04/2022 7.8     ALBUMIN 01/04/2022 5.0     AST 01/04/2022 46     ALT 01/04/2022 40     ALK PHOS 01/04/2022 74     EXTERNAL TOT. BILIRUBIN 01/04/2022 0.3     EXTERNAL EGFR 01/04/2022 >60     Lithium Lvl 01/04/2022 0.6     TSH  01/04/2022 1.510        Suicide/Homicide Risk Assessment:    Risk of Harm to Self:  The following ratings are based on assessment at the time of the interview  Historical Risk Factors include: chronic psychiatric problems  Protective Factors: no current suicidal ideation, compliant with mental health treatment, good self-esteem, having a desire to be alive, stable living environment, stable job, strong relationships    Risk of Harm to Others:  The following ratings are based on assessment at the time of the interview  Historical Risk Factors include: history of aggressive behavior.  Protective Factors: no current homicidal ideation, compliant with mental health treatment, good self-esteem, safe and stable living environment    The following interventions are recommended: contracts for safety at present - agrees to go to ED if feeling unsafe, contracts for safety at present - agrees to call Crisis Intervention Service if feeling unsafe      Lethality Statement:    Based on today's assessment and clinical criteria, Santana Norris contracts for safety and is not an imminent risk of harm to self or others. Outpatient level of care is deemed appropriate at this current time. Santana understands that if they can no longer contract for safety, they need to call the office or report to their nearest Emergency Room for immediate evaluation. They voiced understanding and agreement to call 911 or head to the nearest ED should they have any physical or mental decompensation whatsoever.       Assessment/Plan:        1.)  IED  2.)  JOAO  3.)  Rule out BPAD    After discussion of risks, benefits, and side effects, alternatives, we will continue current regimen of Abilify 5 mg/day, Strattera 100 mg/day, Lamictal 200 mg/day, lithium 300 mg twice daily, Wellbutrin 300 mg daily.  Patient denies acute mental complaints or concerns at this time.  He will obtain lithium level prior to our next appointment given that his blood pressure  medications are being adjusted which may affect lithium level.  Patient stated that he also takes care to mention his lithium to his other medical specialist when they are adjusting his blood pressure medication        Medications Risks/Benefits      Risks, Benefits And Possible Side Effects Of Medications:    Risks, benefits, and possible side effects of medications explained to Santana and he verbalizes understanding and agreement for treatment.    Controlled Medication Discussion:     Not applicable    Psychotherapy Provided:     Individual psychotherapy provided: Crisis/safety plan discussed with Santana. Provided at least 16 minutes of distinct psychotherapy using a combination of supportive, interpersonal, motivational, and problem solving approaches to address psychological distress and enhance coping strategies.     Treatment Plan:    Completed and signed during the session: We will perform in next appointment due to lack of time today      Visit Time    Visit Start Time: 5:30 PM  Visit Stop Time: 5:58 PM  Total Visit Duration: 28 minutes     The total visit duration detailed above includes: patient engagement, medication management, psychotherapy/counseling, discussion regarding treatment goals, documentation, review of past medical records, and coordination of care.      Note Share Disclaimer:     This note was not shared with the patient due to reasonable likelihood of causing patient harm      Hazel Alcaraz DO  Psychiatry  05/30/25

## 2025-06-12 ENCOUNTER — TELEPHONE (OUTPATIENT)
Dept: PSYCHIATRY | Facility: CLINIC | Age: 30
End: 2025-06-12

## 2025-06-12 NOTE — TELEPHONE ENCOUNTER
Called pt regarding scheduling 3mo f/u appt with Dr. Alcaraz. Unable to leave VM due to VM box being full.

## 2025-06-13 ENCOUNTER — TELEPHONE (OUTPATIENT)
Dept: PSYCHIATRY | Facility: CLINIC | Age: 30
End: 2025-06-13

## 2025-06-13 NOTE — TELEPHONE ENCOUNTER
Attempted TC to encourage Patient to go for blood work including Lithium Level. No answer/could not leave message as mailbox has not been set up yet.